# Patient Record
Sex: MALE | Race: WHITE
[De-identification: names, ages, dates, MRNs, and addresses within clinical notes are randomized per-mention and may not be internally consistent; named-entity substitution may affect disease eponyms.]

---

## 2017-02-17 ENCOUNTER — HOSPITAL ENCOUNTER (EMERGENCY)
Dept: HOSPITAL 17 - PHED | Age: 57
Discharge: HOME | End: 2017-02-17
Payer: COMMERCIAL

## 2017-02-17 VITALS
SYSTOLIC BLOOD PRESSURE: 149 MMHG | HEART RATE: 58 BPM | DIASTOLIC BLOOD PRESSURE: 89 MMHG | TEMPERATURE: 98 F | OXYGEN SATURATION: 99 % | RESPIRATION RATE: 16 BRPM

## 2017-02-17 VITALS — HEIGHT: 71 IN | BODY MASS INDEX: 31.67 KG/M2 | WEIGHT: 226.19 LBS

## 2017-02-17 VITALS
DIASTOLIC BLOOD PRESSURE: 86 MMHG | SYSTOLIC BLOOD PRESSURE: 148 MMHG | RESPIRATION RATE: 17 BRPM | OXYGEN SATURATION: 98 % | HEART RATE: 58 BPM

## 2017-02-17 VITALS — OXYGEN SATURATION: 97 %

## 2017-02-17 DIAGNOSIS — K57.32: Primary | ICD-10-CM

## 2017-02-17 LAB
ALP SERPL-CCNC: 102 U/L (ref 45–117)
ALT SERPL-CCNC: 42 U/L (ref 12–78)
ANION GAP SERPL CALC-SCNC: 8 MEQ/L (ref 5–15)
AST SERPL-CCNC: 20 U/L (ref 15–37)
BASOPHILS # BLD AUTO: 0.1 TH/MM3 (ref 0–0.2)
BASOPHILS NFR BLD: 0.6 % (ref 0–2)
BILIRUB SERPL-MCNC: 0.8 MG/DL (ref 0.2–1)
BUN SERPL-MCNC: 14 MG/DL (ref 7–18)
CHLORIDE SERPL-SCNC: 105 MEQ/L (ref 98–107)
COLOR UR: YELLOW
COMMENT (UR): (no result)
CULTURE IF INDICATED: (no result)
EOSINOPHIL # BLD: 0.1 TH/MM3 (ref 0–0.4)
EOSINOPHIL NFR BLD: 1.1 % (ref 0–4)
ERYTHROCYTE [DISTWIDTH] IN BLOOD BY AUTOMATED COUNT: 11.9 % (ref 11.6–17.2)
GFR SERPLBLD BASED ON 1.73 SQ M-ARVRAT: 81 ML/MIN (ref 89–?)
GLUCOSE UR STRIP-MCNC: (no result) MG/DL
HCO3 BLD-SCNC: 28.5 MEQ/L (ref 21–32)
HCT VFR BLD CALC: 43.2 % (ref 39–51)
HEMO FLAGS: (no result)
HGB UR QL STRIP: (no result)
KETONES UR STRIP-MCNC: (no result) MG/DL
LEUKOCYTE ESTERASE UR QL STRIP: (no result) /HPF (ref 0–5)
LYMPHOCYTES # BLD AUTO: 1.5 TH/MM3 (ref 1–4.8)
LYMPHOCYTES NFR BLD AUTO: 16.7 % (ref 9–44)
MCH RBC QN AUTO: 32 PG (ref 27–34)
MCHC RBC AUTO-ENTMCNC: 34.2 % (ref 32–36)
MCV RBC AUTO: 93.4 FL (ref 80–100)
METHOD OF COLLECTION: (no result)
MONOCYTES NFR BLD: 8.3 % (ref 0–8)
MUCOUS THREADS #/AREA URNS LPF: (no result) /LPF
NEUTROPHILS # BLD AUTO: 6.3 TH/MM3 (ref 1.8–7.7)
NEUTROPHILS NFR BLD AUTO: 73.3 % (ref 16–70)
NITRITE UR QL STRIP: (no result)
PLATELET # BLD: 357 TH/MM3 (ref 150–450)
POTASSIUM SERPL-SCNC: 4.1 MEQ/L (ref 3.5–5.1)
RBC # BLD AUTO: 4.63 MIL/MM3 (ref 4.5–5.9)
RBC #/AREA URNS HPF: (no result) /HPF (ref 0–3)
SODIUM SERPL-SCNC: 141 MEQ/L (ref 136–145)
SP GR UR STRIP: 1.03 (ref 1–1.03)
SQUAMOUS #/AREA URNS HPF: (no result) /HPF (ref 0–5)
WBC # BLD AUTO: 8.7 TH/MM3 (ref 4–11)

## 2017-02-17 PROCEDURE — 83690 ASSAY OF LIPASE: CPT

## 2017-02-17 PROCEDURE — 96375 TX/PRO/DX INJ NEW DRUG ADDON: CPT

## 2017-02-17 PROCEDURE — 85025 COMPLETE CBC W/AUTO DIFF WBC: CPT

## 2017-02-17 PROCEDURE — 99284 EMERGENCY DEPT VISIT MOD MDM: CPT

## 2017-02-17 PROCEDURE — 87040 BLOOD CULTURE FOR BACTERIA: CPT

## 2017-02-17 PROCEDURE — 81001 URINALYSIS AUTO W/SCOPE: CPT

## 2017-02-17 PROCEDURE — 96367 TX/PROPH/DG ADDL SEQ IV INF: CPT

## 2017-02-17 PROCEDURE — 74176 CT ABD & PELVIS W/O CONTRAST: CPT

## 2017-02-17 PROCEDURE — 80053 COMPREHEN METABOLIC PANEL: CPT

## 2017-02-17 PROCEDURE — 96365 THER/PROPH/DIAG IV INF INIT: CPT

## 2017-02-17 NOTE — RADHPO
EXAM DATE/TIME:  02/17/2017 13:02 

 

[facility] COMPARISON:     

No previous studies available for comparison.

 

 

INDICATIONS :     

Left lower quadrant pain. 

                  

 

ORAL CONTRAST:      

No oral contrast ingested.

                  

 

RADIATION DOSE:     

18.63 CTDIvol (mGy) 

 

 

MEDICAL HISTORY :     

Diverticulitis.  

 

SURGICAL HISTORY :      

None. 

 

ENCOUNTER:      

Initial

 

ACUITY:      

2 days

 

PAIN SCALE:      

5/10

 

LOCATION:       

Left lower quadrant 

 

TECHNIQUE:     

Volumetric scanning of the abdomen and pelvis was performed.  Using automated exposure control and ad
justment of the mA and/or kV according to patient size, radiation dose was kept as low as reasonably 
achievable to obtain optimal diagnostic quality images. 

 

FINDINGS:     

 

LOWER LUNGS:     

The visualized lower lungs are clear.

 

LIVER:     

Homogeneous density without lesion.  There is no dilation of the biliary tree.  No calcified gallston
es.

 

SPLEEN:     

Normal size without lesion.

 

PANCREAS:     

Within normal limits. 

 

KIDNEYS:     

Normal in size and shape.  There is no mass, stone, or hydronephrosis.

 

ADRENAL GLANDS:     

Within normal limits.

 

VASCULAR:     

There is no aortic aneurysm.

 

BOWEL/MESENTERY:     

There is mild wall thickening and surrounding inflammatory change involving the proximal sigmoid colo
n with multiple diverticuli. There is no free air or fluid about gas pattern is nonobstructive. There
 is a normal appendix.

 

ABDOMINAL WALL:     

Within normal limits.

 

RETROPERITONEUM:     

There is no lymphadenopathy.

 

BLADDER:     

No wall thickening or mass.

 

REPRODUCTIVE:     

Within normal limits.

 

INGUINAL:     

There is no lymphadenopathy or hernia.

 

MUSCULOSKELETAL:     

Within normal limits for patient age.

 

CONCLUSION:     

Findings characteristic of acute diverticulitis involving the proximal sigmoid colon.

 

 

 

 Anuj Garcia MD on February 17, 2017 at 13:14           

Board Certified Radiologist.

 This report was verified electronically.

## 2018-03-09 ENCOUNTER — HOSPITAL ENCOUNTER (INPATIENT)
Dept: HOSPITAL 17 - PHED | Age: 58
LOS: 7 days | Discharge: HOME | DRG: 392 | End: 2018-03-16
Attending: HOSPITALIST | Admitting: HOSPITALIST
Payer: COMMERCIAL

## 2018-03-09 VITALS
HEART RATE: 72 BPM | TEMPERATURE: 99.3 F | OXYGEN SATURATION: 96 % | DIASTOLIC BLOOD PRESSURE: 81 MMHG | RESPIRATION RATE: 20 BRPM | SYSTOLIC BLOOD PRESSURE: 141 MMHG

## 2018-03-09 VITALS
DIASTOLIC BLOOD PRESSURE: 64 MMHG | RESPIRATION RATE: 18 BRPM | TEMPERATURE: 99.4 F | OXYGEN SATURATION: 98 % | HEART RATE: 77 BPM | SYSTOLIC BLOOD PRESSURE: 134 MMHG

## 2018-03-09 VITALS
TEMPERATURE: 98.1 F | DIASTOLIC BLOOD PRESSURE: 91 MMHG | SYSTOLIC BLOOD PRESSURE: 161 MMHG | OXYGEN SATURATION: 96 % | HEART RATE: 66 BPM | RESPIRATION RATE: 16 BRPM

## 2018-03-09 VITALS
OXYGEN SATURATION: 98 % | RESPIRATION RATE: 18 BRPM | DIASTOLIC BLOOD PRESSURE: 84 MMHG | HEART RATE: 79 BPM | SYSTOLIC BLOOD PRESSURE: 132 MMHG

## 2018-03-09 VITALS — DIASTOLIC BLOOD PRESSURE: 82 MMHG | SYSTOLIC BLOOD PRESSURE: 140 MMHG

## 2018-03-09 VITALS
RESPIRATION RATE: 20 BRPM | SYSTOLIC BLOOD PRESSURE: 148 MMHG | OXYGEN SATURATION: 99 % | DIASTOLIC BLOOD PRESSURE: 88 MMHG | HEART RATE: 64 BPM | TEMPERATURE: 97.6 F

## 2018-03-09 VITALS
TEMPERATURE: 98.6 F | OXYGEN SATURATION: 97 % | HEART RATE: 64 BPM | DIASTOLIC BLOOD PRESSURE: 80 MMHG | SYSTOLIC BLOOD PRESSURE: 125 MMHG | RESPIRATION RATE: 15 BRPM

## 2018-03-09 VITALS — WEIGHT: 227.96 LBS | HEIGHT: 72 IN | BODY MASS INDEX: 30.88 KG/M2

## 2018-03-09 VITALS — OXYGEN SATURATION: 99 %

## 2018-03-09 DIAGNOSIS — E03.9: ICD-10-CM

## 2018-03-09 DIAGNOSIS — E86.0: ICD-10-CM

## 2018-03-09 DIAGNOSIS — Z87.891: ICD-10-CM

## 2018-03-09 DIAGNOSIS — K56.7: ICD-10-CM

## 2018-03-09 DIAGNOSIS — E78.5: ICD-10-CM

## 2018-03-09 DIAGNOSIS — K59.00: ICD-10-CM

## 2018-03-09 DIAGNOSIS — E07.9: ICD-10-CM

## 2018-03-09 DIAGNOSIS — K57.20: Primary | ICD-10-CM

## 2018-03-09 DIAGNOSIS — K52.9: ICD-10-CM

## 2018-03-09 DIAGNOSIS — K21.9: ICD-10-CM

## 2018-03-09 DIAGNOSIS — D72.829: ICD-10-CM

## 2018-03-09 LAB
ALBUMIN SERPL-MCNC: 3.4 GM/DL (ref 3.4–5)
ALP SERPL-CCNC: 79 U/L (ref 45–117)
ALT SERPL-CCNC: 24 U/L (ref 12–78)
AST SERPL-CCNC: 16 U/L (ref 15–37)
BASOPHILS # BLD AUTO: 0.1 TH/MM3 (ref 0–0.2)
BASOPHILS NFR BLD: 0.6 % (ref 0–2)
BILIRUB SERPL-MCNC: 0.5 MG/DL (ref 0.2–1)
BUN SERPL-MCNC: 16 MG/DL (ref 7–18)
CALCIUM SERPL-MCNC: 9.2 MG/DL (ref 8.5–10.1)
CHLORIDE SERPL-SCNC: 101 MEQ/L (ref 98–107)
CREAT SERPL-MCNC: 1 MG/DL (ref 0.6–1.3)
EOSINOPHIL # BLD: 0 TH/MM3 (ref 0–0.4)
EOSINOPHIL NFR BLD: 0.3 % (ref 0–4)
ERYTHROCYTE [DISTWIDTH] IN BLOOD BY AUTOMATED COUNT: 11.8 % (ref 11.6–17.2)
GFR SERPLBLD BASED ON 1.73 SQ M-ARVRAT: 78 ML/MIN (ref 89–?)
GLUCOSE SERPL-MCNC: 117 MG/DL (ref 74–106)
HCO3 BLD-SCNC: 24.6 MEQ/L (ref 21–32)
HCT VFR BLD CALC: 45.2 % (ref 39–51)
HGB BLD-MCNC: 15 GM/DL (ref 13–17)
LYMPHOCYTES # BLD AUTO: 0.8 TH/MM3 (ref 1–4.8)
LYMPHOCYTES NFR BLD AUTO: 6.2 % (ref 9–44)
MCH RBC QN AUTO: 30.6 PG (ref 27–34)
MCHC RBC AUTO-ENTMCNC: 33.3 % (ref 32–36)
MCV RBC AUTO: 92.1 FL (ref 80–100)
MONOCYTE #: 0.6 TH/MM3 (ref 0–0.9)
MONOCYTES NFR BLD: 4.8 % (ref 0–8)
NEUTROPHILS # BLD AUTO: 11.1 TH/MM3 (ref 1.8–7.7)
NEUTROPHILS NFR BLD AUTO: 88.1 % (ref 16–70)
PLATELET # BLD: 508 TH/MM3 (ref 150–450)
PMV BLD AUTO: 8.8 FL (ref 7–11)
PROT SERPL-MCNC: 7.4 GM/DL (ref 6.4–8.2)
RBC # BLD AUTO: 4.9 MIL/MM3 (ref 4.5–5.9)
SODIUM SERPL-SCNC: 138 MEQ/L (ref 136–145)
WBC # BLD AUTO: 12.6 TH/MM3 (ref 4–11)

## 2018-03-09 PROCEDURE — 96374 THER/PROPH/DIAG INJ IV PUSH: CPT

## 2018-03-09 PROCEDURE — 83690 ASSAY OF LIPASE: CPT

## 2018-03-09 PROCEDURE — 96375 TX/PRO/DX INJ NEW DRUG ADDON: CPT

## 2018-03-09 PROCEDURE — 80053 COMPREHEN METABOLIC PANEL: CPT

## 2018-03-09 PROCEDURE — 74177 CT ABD & PELVIS W/CONTRAST: CPT

## 2018-03-09 PROCEDURE — 82378 CARCINOEMBRYONIC ANTIGEN: CPT

## 2018-03-09 PROCEDURE — 85025 COMPLETE CBC W/AUTO DIFF WBC: CPT

## 2018-03-09 PROCEDURE — 80048 BASIC METABOLIC PNL TOTAL CA: CPT

## 2018-03-09 PROCEDURE — 96361 HYDRATE IV INFUSION ADD-ON: CPT

## 2018-03-09 RX ADMIN — PHENYTOIN SODIUM SCH MLS/HR: 50 INJECTION INTRAMUSCULAR; INTRAVENOUS at 17:13

## 2018-03-09 RX ADMIN — SODIUM CHLORIDE SCH MLS/HR: 900 INJECTION, SOLUTION INTRAVENOUS at 21:22

## 2018-03-09 RX ADMIN — SODIUM CHLORIDE SCH MLS/HR: 900 INJECTION, SOLUTION INTRAVENOUS at 11:19

## 2018-03-09 RX ADMIN — Medication SCH ML: at 19:35

## 2018-03-09 RX ADMIN — STANDARDIZED SENNA CONCENTRATE AND DOCUSATE SODIUM SCH TAB: 8.6; 5 TABLET, FILM COATED ORAL at 09:00

## 2018-03-09 RX ADMIN — MORPHINE SULFATE PRN MG: 2 INJECTION, SOLUTION INTRAMUSCULAR; INTRAVENOUS at 21:24

## 2018-03-09 RX ADMIN — PHENYTOIN SODIUM SCH MLS/HR: 50 INJECTION INTRAMUSCULAR; INTRAVENOUS at 05:19

## 2018-03-09 RX ADMIN — MORPHINE SULFATE PRN MG: 2 INJECTION, SOLUTION INTRAMUSCULAR; INTRAVENOUS at 08:32

## 2018-03-09 RX ADMIN — MORPHINE SULFATE PRN MG: 2 INJECTION, SOLUTION INTRAMUSCULAR; INTRAVENOUS at 12:23

## 2018-03-09 RX ADMIN — ONDANSETRON PRN MG: 2 INJECTION, SOLUTION INTRAMUSCULAR; INTRAVENOUS at 12:23

## 2018-03-09 RX ADMIN — ONDANSETRON PRN MG: 2 INJECTION, SOLUTION INTRAMUSCULAR; INTRAVENOUS at 17:13

## 2018-03-09 RX ADMIN — MORPHINE SULFATE PRN MG: 2 INJECTION, SOLUTION INTRAMUSCULAR; INTRAVENOUS at 17:15

## 2018-03-09 RX ADMIN — Medication SCH ML: at 09:00

## 2018-03-09 RX ADMIN — STANDARDIZED SENNA CONCENTRATE AND DOCUSATE SODIUM SCH TAB: 8.6; 5 TABLET, FILM COATED ORAL at 21:00

## 2018-03-09 RX ADMIN — LEVOTHYROXINE SODIUM SCH MCG: 100 TABLET ORAL at 06:40

## 2018-03-09 RX ADMIN — PHENYTOIN SODIUM SCH MLS/HR: 50 INJECTION INTRAMUSCULAR; INTRAVENOUS at 05:45

## 2018-03-09 RX ADMIN — SODIUM CHLORIDE SCH MLS/HR: 900 INJECTION, SOLUTION INTRAVENOUS at 17:12

## 2018-03-09 RX ADMIN — LEVOTHYROXINE SODIUM SCH MCG: 75 TABLET ORAL at 06:40

## 2018-03-09 RX ADMIN — PHENYTOIN SODIUM SCH MLS/HR: 50 INJECTION INTRAMUSCULAR; INTRAVENOUS at 06:40

## 2018-03-09 NOTE — HHI.HP
__________________________________________________





HPI


Service


[facility]


Primary Care Physician


Víctor Martin, 


Admission Diagnosis





Severe diverticulitis with abscess formation


Diagnoses:  


Travel History


International Travel<30 Days:  No


Contact w/Intl Traveler <30 Da:  No


Traveled to Known Affected Are:  No





Sepsis Criteria


SIRS Criteria (2 or more):  WBC > 50316, < 4000 or > 10% bands


Sepsis Criteria (SIRS+source):  Infect source susp/known


History of Present Illness


Mr. Kenny is a 57 year old male.  He has a history of diverticulitis in the 

past.  He has been having LLQ pain and visited our ER in regards to this.  He 

is found to have reticulitis with abscess.  No evidence of perforation.  

Abscess sizes are approximately 2 cm and 3 cm.  Pain is fairly intense and has 

been controlled with IV morphine at this point.  Sepsis criteria are not 

present.  Patient reports that he's been off her surgery in regards to his 

diverticulitis in the past.  At that time he had declined surgery.  No other 

complaints at this time.  Other medical conditions include hyperlipidemia, 

gastroesophageal reflux disease, and hyperthyroidism.  She also has a history 

of left foot injury with surgical repair and is planning to have a revision of 

the hardware in his left foot soon.





Review of Systems


Constitutional:  COMPLAINS OF: Fatigue, DENIES: Fever, Chills, Night Sweats


Eyes:  DENIES: Blurred vision, Diplopia, Eye inflammation, Eye pain


Ears, nose, mouth, throat:  DENIES: Tinnitus, Hearing loss, Vertigo, Nasal 

discharge


Respiratory:  DENIES: Cough, Snoring, Wheezing, Shortness of breath


Cardiovascular:  DENIES: Chest pain, Palpitations, Syncope


Gastrointestinal:  DENIES: Black stools, Bloody stools, Diarrhea


Musculoskeletal:  DENIES: Joint pain, Muscle aches, Stiffness, Joint Swelling


Integumentary:  DENIES: Abnormal pigmentation, Nail changes, Pruritus, Rash


Hematologic/lymphatic:  DENIES: Bruising, Lymphadenopathy


Immunologic/allergic:  DENIES: Eczema, Urticaria


Neurologic:  DENIES: Abnormal gait, Headache, Paresthesias


Psychiatric:  DENIES: Anxiety, Confusion, Hallucinations





Past Family Social History


Past Medical History


Gastroesophageal reflux disease


Hypothyroidism


Hyperlipidemia


Diverticulosis


History of diverticulitis


Past Surgical History


Left ankle surgery


Reported Medications





Reported Meds & Active Scripts


Active


Reported


Prilosec (Omeprazole Magnesium) 20 Mg Tab   


Synthroid (Levothyroxine Sodium) 175 Mcg Tab 175 Mcg PO DAILY


Allergies:  


Coded Allergies:  


     No Known Allergies (Unverified  Allergy, Unknown, 3/9/18)


Active Ordered Medications





Administered Medications








 Medications


  (Trade)  Dose


 Ordered  Sig/Zacarias


 Route


 PRN Reason  Start Time


 Stop Time Status Last Admin


Dose Admin


 


 Metronidazole  100 ml @ 


 100 mls/hr  Q6H


 IV


   3/9/18 11:00


    3/9/18 11:19


 


 


 Sodium Chloride  1,000 ml @ 


 100 mls/hr  Q10H


 IV


   3/9/18 05:20


    3/9/18 06:40


 


 


 Ondansetron HCl


  (Zofran Inj)  4 mg  Q6H  PRN


 IVP


 NAUSEA OR VOMITING  3/9/18 05:30


    3/9/18 12:23


 


 


 Levothyroxine


 Sodium


  (Synthroid)  75 mcg  DAILY@0600


 PO


   3/9/18 06:00


    3/9/18 06:40


 


 


 Levothyroxine


 Sodium


  (Synthroid)  100 mcg  DAILY@0600


 PO


   3/9/18 06:00


    3/9/18 06:40


 


 


 Morphine Sulfate


  (Morphine Inj)  4 mg  Q4H  PRN


 IV PUSH


 Pain 7 to 10  3/9/18 08:30


    3/9/18 12:23


 








Family History


Hyperlipidemia


Social History


Patient drinks alcohol occasionally


No illicit drug abuse


Smoking history is present, patient quit in .





Physical Exam


Vital Signs





Vital Signs








  Date Time  Temp Pulse Resp B/P (MAP) Pulse Ox O2 Delivery O2 Flow Rate FiO2


 


3/9/18 16:00 99.4 77 18 134/64 (87) 98   


 


3/9/18 12:28   18     


 


3/9/18 12:00 98.1 66 16 161/91 (114) 96   


 


3/9/18 08:00 98.6 64 15 125/80 (95) 97   


 


3/9/18 06:25  68 18 140/82 (101) 98   


 


3/9/18 06:23   18     


 


3/9/18 05:16  79 18 132/84 (100) 98 Room Air  


 


3/9/18 04:34   18     


 


3/9/18 04:09     99   


 


3/9/18 03:19 97.6 64 20 148/88 (108) 99   








Physical Exam


GENERAL: NAD, A&Ox3


HEAD: Normocephalic. 


NECK: Supple, trachea midline. No lymphadenopathy.


EYES: No scleral icterus. No injection or drainage. 


CARDIOVASCULAR: Regular rate and rhythm without murmurs, gallops, or rubs. 


RESPIRATORY: Breath sounds equal bilaterally. No accessory muscle use.


GASTROINTESTINAL: Left lower quadrant tenderness with guarding, no rigidity


MUSCULOSKELETAL: No cyanosis, or edema. 


SKIN: Warm and dry.


NEURO:  No focal neurological deficitis.


Laboratory





Laboratory Tests








Test


  3/9/18


04:00 3/9/18


12:30


 


White Blood Count 12.6  


 


Red Blood Count 4.90  


 


Hemoglobin 15.0  


 


Hematocrit 45.2  


 


Mean Corpuscular Volume 92.1  


 


Mean Corpuscular Hemoglobin 30.6  


 


Mean Corpuscular Hemoglobin


Concent 33.3 


  


 


 


Red Cell Distribution Width 11.8  


 


Platelet Count 508  


 


Mean Platelet Volume 8.8  


 


Neutrophils (%) (Auto) 88.1  


 


Lymphocytes (%) (Auto) 6.2  


 


Monocytes (%) (Auto) 4.8  


 


Eosinophils (%) (Auto) 0.3  


 


Basophils (%) (Auto) 0.6  


 


Neutrophils # (Auto) 11.1  


 


Lymphocytes # (Auto) 0.8  


 


Monocytes # (Auto) 0.6  


 


Eosinophils # (Auto) 0.0  


 


Basophils # (Auto) 0.1  


 


CBC Comment DIFF FINAL  


 


Differential Comment   


 


Blood Urea Nitrogen 16  


 


Creatinine 1.00  


 


Random Glucose 117  


 


Total Protein 7.4  


 


Albumin 3.4  


 


Calcium Level 9.2  


 


Alkaline Phosphatase 79  


 


Aspartate Amino Transf


(AST/SGOT) 16 


  


 


 


Alanine Aminotransferase


(ALT/SGPT) 24 


  


 


 


Total Bilirubin 0.5  


 


Sodium Level 138  


 


Potassium Level 3.7  


 


Chloride Level 101  


 


Carbon Dioxide Level 24.6  


 


Anion Gap 12  


 


Estimat Glomerular Filtration


Rate 78 


  


 


 


Lipase 141  


 


Carcinoembryonic Antigen  1.8 








Result Diagram:  


3/9/18 0400                                                                    

            3/9/18 0400








Caprini VTE Risk Assessment


Caprini VTE Risk Assessment:  No/Low Risk (score <= 1)


Caprini Risk Assessment Model











 Point Value = 1          Point Value = 2  Point Value = 3  Point Value = 5


 


Age 41-60


Minor surgery


BMI > 25 kg/m2


Swollen legs


Varicose veins


Pregnancy or postpartum


History of unexplained or recurrent


   spontaneous 


Oral contraceptives or hormone


   replacement


Sepsis (< 1 month)


Serious lung disease, including


   pneumonia (< 1 month)


Abnormal pulmonary function


Acute myocardial infarction


Congestive heart failure (< 1 month)


History of inflammatory bowel disease


Medical patient at bed rest Age 61-74


Arthroscopic surgery


Major open surgery (> 45 min)


Laparoscopic surgery (> 45 min)


Malignancy


Confined to bed (> 72 hours)


Immobilizing plaster cast


Central venous access Age >= 75


History of VTE


Family history of VTE


Factor V Leiden


Prothrombin 29189W


Lupus anticoagulant


Anticardiolipin antibodies


Elevated serum homocysteine


Heparin-induced thrombocytopenia


Other congenital or acquired


   thrombophilia Stroke (< 1 month)


Elective arthroplasty


Hip, pelvis, or leg fracture


Acute spinal cord injury (< 1 month)








Prophylaxis Regimen











   Total Risk


Factor Score Risk Level Prophylaxis Regimen


 


0-1      Low Early ambulation


 


2 Moderate Order ONE of the following:


*Sequential Compression Device (SCD)


*Heparin 5000 units SQ BID


 


3-4 Higher Order ONE of the following medications:


*Heparin 5000 units SQ TID


*Enoxaparin/Lovenox 40 mg SQ daily (WT < 150 kg, CrCl > 30 mL/min)


*Enoxaparin/Lovenox 30 mg SQ daily (WT < 150 kg, CrCl > 10-29 mL/min)


*Enoxaparin/Lovenox 30 mg SQ BID (WT < 150 kg, CrCl > 30 mL/min)


AND/OR


*Sequential Compression Device (SCD)


 


5 or more Highest Order ONE of the following medications:


*Heparin 5000 units SQ TID (Preferred with Epidurals)


*Enoxaparin/Lovenox 40 mg SQ daily (WT < 150 kg, CrCl > 30 mL/min)


*Enoxaparin/Lovenox 30 mg SQ daily (WT < 150 kg, CrCl > 10-29 mL/min)


*Enoxaparin/Lovenox 30 mg SQ BID (WT < 150 kg, CrCl > 30 mL/min)


AND


*Sequential Compression Device (SCD)











Assessment and Plan


Problem List:  


(1) Intra-abdominal abscess


ICD Code:  K65.1 - Peritoneal abscess


Status:  Acute


(2) Acute diverticulitis


ICD Code:  K57.92 - Diverticulitis of intestine, part unspecified, without 

perforation or abscess without bleeding


Status:  Acute


Assessment and Plan


57-year-old male admitted secondary to acute diverticulitis with abscess





Acute diverticulitis


Intra-abdominal abscess


Diverticulosis


Prior history of diverticulitis


Continue Levaquin


Continue metronidazole


IV hydration


Sips of clear liquids


GI consulted


Surgery consulted


Monitor for any worsening clinical status





Hypothyroidism


Hold treatment for now


Resume treatment when patient taking by mouth again





Gastroesophageal reflux disease


Treatment on hold for now





Hyperlipidemia


Hold treatment for now





DVT prophylaxis


SCDs





Physician Certification


2 Midnight Certification Type:  Admission for Inpatient Services


Order for Inpatient Services


The services are ordered in accordance with Medicare regulations or non-

Medicare payer requirements, as applicable.  In the case of services not 

specified as inpatient-only, they are appropriately provided as inpatient 

services in accordance with the 2-midnight benchmark.


Estimated LOS (days):  4


 days is the estimated time the patient will need to remain in the hospital, 

assuming treatment plan goals are met and no additional complications.


Post-Hospital Plan:  Home











Tee Kinsey MD Mar 9, 2018 17:04

## 2018-03-09 NOTE — RADRPT
EXAM DATE/TIME:  03/09/2018 04:19 

 

[facility] COMPARISON:     

CT ABDOMEN & PELVIS W/O CONTRAST, February 17, 2017, 13:02.

 

 

INDICATIONS :     

Lower abdomen pain past week.

                      

 

IV CONTRAST:     

95 cc Omnipaque 350 (iohexol) IV 

 

 

ORAL CONTRAST:      

No oral contrast ingested.

                      

 

RADIATION DOSE:     

18.29 CTDIvol (mGy) 

 

 

MEDICAL HISTORY :     

Diverticulitis. Gastroesophageal reflux disease. Hypercholesterolemia.

 

SURGICAL HISTORY :      

None. 

 

ENCOUNTER:      

Initial

 

ACUITY:      

1 week

 

PAIN SCALE:      

9/10

 

LOCATION:       

Bilateral  low abdomen

 

TECHNIQUE:     

Volumetric scanning of the abdomen and pelvis was performed.  Using automated exposure control and ad
justment of the mA and/or kV according to patient size, radiation dose was kept as low as reasonably 
achievable to obtain optimal diagnostic quality images.  DICOM format image data is available electro
nically for review and comparison.  

 

FINDINGS:     

 

LOWER LUNGS:     

The visualized lower lungs are clear.

 

LIVER:     

Homogeneous density without lesion.  There is no dilation of the biliary tree.  No calcified gallston
es.

 

SPLEEN:     

Normal size without lesion.

 

PANCREAS:     

Within normal limits.

 

KIDNEYS:     

A few scattered small cysts of both kidneys.

 

ADRENAL GLANDS:     

Within normal limits.

 

VASCULAR:     

There is no aortic aneurysm.

 

BOWEL/MESENTERY:     

There is severe colitis in the region of the proximal sigmoid colon in the left lower quadrant. This 
is presumably on the basis of diverticulitis but the colitis extends distal from about 10 cm and prox
imal by about 5 cm from the epicenter of inflammation. Anterior to the epicenter is an ill-defined ex
traluminal fluid collection containing pockets of gas compatible with an abscess and measures approxi
mately 2.9 x 3.9 x 3.5 cm. There is also an abscess within the wall of the sigmoid colon demonstrated
 on series 2 image 56 and series 601 image 41, measures approximately 2.1 x 2.1 x 1.8 cm. Slightly mo
re distal is an additional intramural fluid collection that measures approximately 1.5 cm, series 2 i
mage 62. No free intraperitoneal air. No obstruction demonstrated. The appendix is normal.

 

ABDOMINAL WALL:     

Within normal limits.

 

RETROPERITONEUM:     

There is no lymphadenopathy. 

 

BLADDER:     

No wall thickening or mass. 

 

REPRODUCTIVE:     

Within normal limits.

 

INGUINAL:     

There is no lymphadenopathy or hernia. 

 

MUSCULOSKELETAL:     

No acute bony abnormality.

 

CONCLUSION:     

Severe sigmoid colitis, presumably diverticulitis. This is complicated by intramural abscesses and al
so an extraluminal abscess in the left lower quadrant.

 

 

 

 Nile Morrison MD on March 09, 2018 at 4:41           

Board Certified Radiologist.

 This report was verified electronically.

## 2018-03-09 NOTE — PD
HPI


Chief Complaint:  GI Complaint


Time Seen by Provider:  03:22


Travel History


International Travel<30 days:  No


Contact w/Intl Traveler<30days:  No


Traveled to known affect area:  No





History of Present Illness


HPI


The patient is a 57-year-old male with a history of diverticulitis who is 

scheduled this morning to have endoscopy done by Dr. Brown in [facility].  

Patient experienced severe left lower quadrant pain and came to the emergency 

department.  He had already taken some of the bowel prep that was given to him 

but his pain increased after he took the bowel prep.  He denies any fever.  The 

patient's pain is a crampy, sharp pain in the left lower quadrant and a an 8/10.





PFSH


Past Medical History


High Cholesterol:  Yes


Diverticulitis:  Yes


GERD:  Yes


Thyroid Disease:  Yes


Tetanus Vaccination:  Unknown


Influenza Vaccination:  Yes





Family History


Family Hypercholesterolemia:  Yes





Social History


Alcohol Use:  Yes (occ)


Tobacco Use:  No (quit 2017)


Substance Use:  No





Allergies-Medications


(Allergen,Severity, Reaction):  


Coded Allergies:  


     No Known Allergies (Unverified  Adverse Reaction, Unknown, 3/9/18)


Reported Meds & Prescriptions





Reported Meds & Active Scripts


Active


Reported


Prilosec (Omeprazole Magnesium) 20 Mg Tab   


Synthroid (Levothyroxine Sodium) 175 Mcg Tab 175 Mcg PO DAILY








Review of Systems


Except as stated in HPI:  all other systems reviewed are Neg





Physical Exam


Narrative


GENERAL: The patient is alert, oriented 3 in moderate to severe distress with 

his left lower quadrant abdominal discomfort.  The vital signs show temperature 

97.6 with blood pressure 148/88 but the rest of the vital signs are normal.  

The patient also appears to be mild to moderately dehydrated.


SKIN: Focused skin assessment warm/dry.


HEAD: Atraumatic. Normocephalic. 


EYES: Pupils equal and round. No scleral icterus. No injection or drainage. 


ENT: No nasal bleeding or discharge.  Mucous membranes pink and moist.


NECK: Trachea midline. No JVD. 


CARDIOVASCULAR: Regular rate and rhythm.  No murmur appreciated.


RESPIRATORY: No accessory muscle use. Clear to auscultation. Breath sounds 

equal bilaterally. 


GASTROINTESTINAL: Abdomen shows guarding in the left lower quadrant with 

tenderness in the left lower quadrant and slight rebound is present, 

nondistended. Hepatic and splenic margins not palpable. 


MUSCULOSKELETAL: No obvious deformities. No clubbing.  No cyanosis.  No edema. 


NEUROLOGICAL: Awake and alert. No obvious cranial nerve deficits.  Motor 

grossly within normal limits. Normal speech.


PSYCHIATRIC: Appropriate mood and affect; insight and judgment normal.


RECTAL EXAM: No masses or tenderness, stool is brown.  No fecal impactions are 

noticed, the stool is guaiac negative.





Data


Data


Last Documented VS





Vital Signs








  Date Time  Temp Pulse Resp B/P (MAP) Pulse Ox O2 Delivery O2 Flow Rate FiO2


 


3/9/18 05:16  79 18 132/84 (100) 98 Room Air  


 


3/9/18 03:19 97.6       








Orders





 Orders


Peg (High)/E-Lyte Liq (Colyte Liq) (3/9/18 03:45)


Complete Blood Count With Diff (3/9/18 03:49)


Comprehensive Metabolic Panel (3/9/18 03:49)


Lipase (3/9/18 03:49)


Ct Abd/Pel W Iv Contrast(Rout) (3/9/18 03:49)


Iv Access Insert/Monitor (3/9/18 03:49)


Ecg Monitoring (3/9/18 03:49)


Oximetry (3/9/18 03:49)


Morphine Inj (Morphine Inj) (3/9/18 04:00)


Ondansetron Inj (Zofran Inj) (3/9/18 04:00)


Sodium Chlor 0.9% 1000 Ml Inj (Ns 1000 M (3/9/18 03:49)


Sodium Chloride 0.9% Flush (Ns Flush) (3/9/18 04:00)


Iohexol 350 Inj (Omnipaque 350 Inj) (3/9/18 03:13)


Metronidazole 500 Mg Inj (Flagyl 500 Mg (3/9/18 05:15)


Levofloxacin 750 Mg Premix Inj (Levaquin (3/9/18 05:15)


Sodium Chlor 0.9% 1000 Ml Inj (Ns 1000 M (3/9/18 05:15)





Labs





Laboratory Tests








Test


  3/9/18


04:00


 


White Blood Count 12.6 TH/MM3 


 


Red Blood Count 4.90 MIL/MM3 


 


Hemoglobin 15.0 GM/DL 


 


Hematocrit 45.2 % 


 


Mean Corpuscular Volume 92.1 FL 


 


Mean Corpuscular Hemoglobin 30.6 PG 


 


Mean Corpuscular Hemoglobin


Concent 33.3 % 


 


 


Red Cell Distribution Width 11.8 % 


 


Platelet Count 508 TH/MM3 


 


Mean Platelet Volume 8.8 FL 


 


Neutrophils (%) (Auto) 88.1 % 


 


Lymphocytes (%) (Auto) 6.2 % 


 


Monocytes (%) (Auto) 4.8 % 


 


Eosinophils (%) (Auto) 0.3 % 


 


Basophils (%) (Auto) 0.6 % 


 


Neutrophils # (Auto) 11.1 TH/MM3 


 


Lymphocytes # (Auto) 0.8 TH/MM3 


 


Monocytes # (Auto) 0.6 TH/MM3 


 


Eosinophils # (Auto) 0.0 TH/MM3 


 


Basophils # (Auto) 0.1 TH/MM3 


 


CBC Comment DIFF FINAL 


 


Differential Comment  


 


Blood Urea Nitrogen 16 MG/DL 


 


Creatinine 1.00 MG/DL 


 


Random Glucose 117 MG/DL 


 


Total Protein 7.4 GM/DL 


 


Albumin 3.4 GM/DL 


 


Calcium Level 9.2 MG/DL 


 


Alkaline Phosphatase 79 U/L 


 


Aspartate Amino Transf


(AST/SGOT) 16 U/L 


 


 


Alanine Aminotransferase


(ALT/SGPT) 24 U/L 


 


 


Total Bilirubin 0.5 MG/DL 


 


Sodium Level 138 MEQ/L 


 


Potassium Level 3.7 MEQ/L 


 


Chloride Level 101 MEQ/L 


 


Carbon Dioxide Level 24.6 MEQ/L 


 


Anion Gap 12 MEQ/L 


 


Estimat Glomerular Filtration


Rate 78 ML/MIN 


 


 


Lipase 141 U/L 











[facility]


Medical Decision Making


Medical Screen Exam Complete:  Yes


Emergency Medical Condition:  Yes


Medical Record Reviewed:  Yes


Interpretation(s)


The CBC shows a white count of 12,600 with 508,000 platelets and 88% 

neutrophils.  The complete metabolic profile shows a GFR of 78 but is otherwise 

normal.  The lipase is normal.  The CT abdomen pelvis shows severe sigmoid 

colitis presumably from diverticulitis.  This is complicated by intramural 

abscesses and also an extraluminal abscess in the left lower quadrant.  The 

extraluminal abscess measures 2.9 x 3.9 x 3.5 cm.


Differential Diagnosis


Diverticulitis, colitis, intra-abdominal abscesses, electrolyte disorder, anemia

, GI bleed, electrolyte disorder, dehydration


Narrative Course


The patient has diverticulitis/colitis with intramural abscesses and an 

extraluminal abscess.





Physician Communication


Physician Communication


I discussed the patient with Dr. Johnson.





Diagnosis





 Primary Impression:  


 Acute diverticulitis


 Additional Impression:  


 Intra-abdominal abscess





Admitting Information


Admitting Physician Requests:  Admit











Parker Lozoya MD Mar 9, 2018 05:08

## 2018-03-10 VITALS
SYSTOLIC BLOOD PRESSURE: 132 MMHG | TEMPERATURE: 99.3 F | HEART RATE: 73 BPM | OXYGEN SATURATION: 92 % | RESPIRATION RATE: 18 BRPM | DIASTOLIC BLOOD PRESSURE: 76 MMHG

## 2018-03-10 VITALS
HEART RATE: 69 BPM | SYSTOLIC BLOOD PRESSURE: 166 MMHG | RESPIRATION RATE: 20 BRPM | DIASTOLIC BLOOD PRESSURE: 102 MMHG | OXYGEN SATURATION: 94 % | TEMPERATURE: 99.7 F

## 2018-03-10 VITALS
OXYGEN SATURATION: 96 % | RESPIRATION RATE: 22 BRPM | TEMPERATURE: 97.8 F | HEART RATE: 86 BPM | DIASTOLIC BLOOD PRESSURE: 89 MMHG | SYSTOLIC BLOOD PRESSURE: 150 MMHG

## 2018-03-10 VITALS
TEMPERATURE: 96.7 F | HEART RATE: 70 BPM | SYSTOLIC BLOOD PRESSURE: 125 MMHG | RESPIRATION RATE: 18 BRPM | DIASTOLIC BLOOD PRESSURE: 80 MMHG | OXYGEN SATURATION: 93 %

## 2018-03-10 VITALS
TEMPERATURE: 98.7 F | SYSTOLIC BLOOD PRESSURE: 121 MMHG | RESPIRATION RATE: 18 BRPM | HEART RATE: 74 BPM | OXYGEN SATURATION: 91 % | DIASTOLIC BLOOD PRESSURE: 70 MMHG

## 2018-03-10 VITALS — RESPIRATION RATE: 18 BRPM

## 2018-03-10 VITALS — RESPIRATION RATE: 16 BRPM

## 2018-03-10 LAB
BASOPHILS # BLD AUTO: 0 TH/MM3 (ref 0–0.2)
BASOPHILS NFR BLD: 0.2 % (ref 0–2)
BUN SERPL-MCNC: 10 MG/DL (ref 7–18)
CALCIUM SERPL-MCNC: 8.4 MG/DL (ref 8.5–10.1)
CHLORIDE SERPL-SCNC: 102 MEQ/L (ref 98–107)
CREAT SERPL-MCNC: 0.88 MG/DL (ref 0.6–1.3)
EOSINOPHIL # BLD: 0 TH/MM3 (ref 0–0.4)
EOSINOPHIL NFR BLD: 0.1 % (ref 0–4)
ERYTHROCYTE [DISTWIDTH] IN BLOOD BY AUTOMATED COUNT: 12.4 % (ref 11.6–17.2)
GFR SERPLBLD BASED ON 1.73 SQ M-ARVRAT: 90 ML/MIN (ref 89–?)
GLUCOSE SERPL-MCNC: 126 MG/DL (ref 74–106)
HCO3 BLD-SCNC: 27.1 MEQ/L (ref 21–32)
HCT VFR BLD CALC: 38.8 % (ref 39–51)
HGB BLD-MCNC: 13.2 GM/DL (ref 13–17)
LYMPHOCYTES # BLD AUTO: 0.5 TH/MM3 (ref 1–4.8)
LYMPHOCYTES NFR BLD AUTO: 4 % (ref 9–44)
MCH RBC QN AUTO: 31.3 PG (ref 27–34)
MCHC RBC AUTO-ENTMCNC: 34 % (ref 32–36)
MCV RBC AUTO: 92.2 FL (ref 80–100)
MONOCYTE #: 0.6 TH/MM3 (ref 0–0.9)
MONOCYTES NFR BLD: 4.3 % (ref 0–8)
NEUTROPHILS # BLD AUTO: 12.2 TH/MM3 (ref 1.8–7.7)
NEUTROPHILS NFR BLD AUTO: 91.4 % (ref 16–70)
PLATELET # BLD: 420 TH/MM3 (ref 150–450)
PMV BLD AUTO: 8.7 FL (ref 7–11)
RBC # BLD AUTO: 4.21 MIL/MM3 (ref 4.5–5.9)
SODIUM SERPL-SCNC: 140 MEQ/L (ref 136–145)
WBC # BLD AUTO: 13.3 TH/MM3 (ref 4–11)

## 2018-03-10 RX ADMIN — ONDANSETRON PRN MG: 2 INJECTION, SOLUTION INTRAMUSCULAR; INTRAVENOUS at 05:41

## 2018-03-10 RX ADMIN — PHENYTOIN SODIUM SCH MLS/HR: 50 INJECTION INTRAMUSCULAR; INTRAVENOUS at 12:14

## 2018-03-10 RX ADMIN — MORPHINE SULFATE PRN MG: 2 INJECTION, SOLUTION INTRAMUSCULAR; INTRAVENOUS at 08:59

## 2018-03-10 RX ADMIN — MORPHINE SULFATE PRN MG: 2 INJECTION, SOLUTION INTRAMUSCULAR; INTRAVENOUS at 12:14

## 2018-03-10 RX ADMIN — MORPHINE SULFATE PRN MG: 2 INJECTION, SOLUTION INTRAMUSCULAR; INTRAVENOUS at 20:27

## 2018-03-10 RX ADMIN — STANDARDIZED SENNA CONCENTRATE AND DOCUSATE SODIUM SCH TAB: 8.6; 5 TABLET, FILM COATED ORAL at 08:59

## 2018-03-10 RX ADMIN — SODIUM CHLORIDE SCH MLS/HR: 900 INJECTION, SOLUTION INTRAVENOUS at 23:56

## 2018-03-10 RX ADMIN — ONDANSETRON PRN MG: 2 INJECTION, SOLUTION INTRAMUSCULAR; INTRAVENOUS at 12:14

## 2018-03-10 RX ADMIN — LEVOTHYROXINE SODIUM SCH MCG: 75 TABLET ORAL at 05:40

## 2018-03-10 RX ADMIN — WATER PRN ML: 1 IRRIGANT IRRIGATION at 23:56

## 2018-03-10 RX ADMIN — ONDANSETRON PRN MG: 2 INJECTION, SOLUTION INTRAMUSCULAR; INTRAVENOUS at 20:26

## 2018-03-10 RX ADMIN — LEVOFLOXACIN SCH MLS/HR: 5 INJECTION, SOLUTION INTRAVENOUS at 05:38

## 2018-03-10 RX ADMIN — SODIUM CHLORIDE SCH MLS/HR: 900 INJECTION, SOLUTION INTRAVENOUS at 11:15

## 2018-03-10 RX ADMIN — STANDARDIZED SENNA CONCENTRATE AND DOCUSATE SODIUM SCH TAB: 8.6; 5 TABLET, FILM COATED ORAL at 20:26

## 2018-03-10 RX ADMIN — MORPHINE SULFATE PRN MG: 2 INJECTION, SOLUTION INTRAMUSCULAR; INTRAVENOUS at 05:41

## 2018-03-10 RX ADMIN — ONDANSETRON PRN MG: 2 INJECTION, SOLUTION INTRAMUSCULAR; INTRAVENOUS at 00:41

## 2018-03-10 RX ADMIN — PHENYTOIN SODIUM SCH MLS/HR: 50 INJECTION INTRAMUSCULAR; INTRAVENOUS at 21:20

## 2018-03-10 RX ADMIN — MORPHINE SULFATE PRN MG: 2 INJECTION, SOLUTION INTRAMUSCULAR; INTRAVENOUS at 00:41

## 2018-03-10 RX ADMIN — SODIUM CHLORIDE SCH MLS/HR: 900 INJECTION, SOLUTION INTRAVENOUS at 17:09

## 2018-03-10 RX ADMIN — WATER PRN ML: 1 IRRIGANT IRRIGATION at 23:54

## 2018-03-10 RX ADMIN — Medication SCH ML: at 20:05

## 2018-03-10 RX ADMIN — MORPHINE SULFATE PRN MG: 2 INJECTION, SOLUTION INTRAMUSCULAR; INTRAVENOUS at 23:54

## 2018-03-10 RX ADMIN — LEVOTHYROXINE SODIUM SCH MCG: 100 TABLET ORAL at 05:40

## 2018-03-10 RX ADMIN — MORPHINE SULFATE PRN MG: 2 INJECTION, SOLUTION INTRAMUSCULAR; INTRAVENOUS at 15:58

## 2018-03-10 RX ADMIN — Medication SCH ML: at 09:00

## 2018-03-10 RX ADMIN — POTASSIUM CHLORIDE SCH MLS/HR: 200 INJECTION, SOLUTION INTRAVENOUS at 12:13

## 2018-03-10 RX ADMIN — POTASSIUM CHLORIDE SCH MLS/HR: 200 INJECTION, SOLUTION INTRAVENOUS at 08:59

## 2018-03-10 RX ADMIN — SODIUM CHLORIDE SCH MLS/HR: 900 INJECTION, SOLUTION INTRAVENOUS at 05:39

## 2018-03-10 RX ADMIN — PHENYTOIN SODIUM SCH MLS/HR: 50 INJECTION INTRAMUSCULAR; INTRAVENOUS at 01:20

## 2018-03-10 NOTE — HHI.PR
Subjective


Remarks


No significant clinical improvement compared to yesterday.  Left lower quadrant 

continues to be painful including with movement.  No fevers overnight.





Objective





Vital Signs








  Date Time  Temp Pulse Resp B/P (MAP) Pulse Ox O2 Delivery O2 Flow Rate FiO2


 


3/10/18 09:04   16     


 


3/10/18 07:42 98.7 74 18 121/70 (87) 91   


 


3/10/18 00:00 99.7 69 20 166/102 (123) 94   


 


3/9/18 20:00 99.3 72 20 141/81 (101) 96   


 


3/9/18 16:00 99.4 77 18 134/64 (87) 98   














I/O      


 


 3/9/18 3/9/18 3/9/18 3/10/18 3/10/18 3/10/18





 07:00 15:00 23:00 07:00 15:00 23:00


 


Intake Total 2100 ml 250 ml 1550 ml 480 ml 1810 ml 


 


Output Total  300 ml 200 ml 450 ml 800 ml 


 


Balance 2100 ml -50 ml 1350 ml 30 ml 1010 ml 


 


      


 


Intake Oral   300 ml 480 ml 960 ml 


 


IV Total 2100 ml 250 ml 1250 ml  850 ml 


 


Output Urine Total  300 ml 200 ml 450 ml 800 ml 


 


# Voids   1   


 


# Bowel Movements     1 








Result Diagram:  


3/10/18 0730                                                                   

             3/10/18 0730





Objective Remarks


GENERAL: NAD, A&Ox3


HEAD: Normocephalic. 


NECK: Supple, trachea midline. No lymphadenopathy.


EYES: No scleral icterus. No injection or drainage. 


CARDIOVASCULAR: Regular rate and rhythm without murmurs, gallops, or rubs. 


RESPIRATORY: Breath sounds equal bilaterally. No accessory muscle use.


GASTROINTESTINAL: Abdomen tender left lower quadrant with guarding.


MUSCULOSKELETAL: No cyanosis, or edema. 


SKIN: Warm and dry.


NEURO:  No focal neurological deficitis.





A/P


Problem List:  


(1) Acute diverticulitis


ICD Code:  K57.92 - Diverticulitis of intestine, part unspecified, without 

perforation or abscess without bleeding


Status:  Acute


(2) Intra-abdominal abscess


ICD Code:  K65.1 - Peritoneal abscess


Status:  Acute


Assessment and Plan


57-year-old male admitted secondary to acute diverticulitis with abscess





No significant improvement at 24 hours.  If no improvement occurs by tomorrow, 

we'll consider repeat imaging.  Labs reviewed.  Slight increase trend upwards 

in white blood cell count, despite antibiotics.  Continue to monitor labs.  

Labs ordered for further monitoring.





Acute diverticulitis


Intra-abdominal abscess


Diverticulosis


Prior history of diverticulitis


Continue Levaquin


Continue metronidazole


IV hydration


Sips of clear liquids


GI consulted


Surgery consulted


Monitor for any worsening clinical status





Hypothyroidism


Hold treatment for now


Resume treatment when patient taking by mouth again





Gastroesophageal reflux disease


Treatment on hold for now





Hyperlipidemia


Hold treatment for now





DVT prophylaxis


SCDs











Tee Kinsey MD Mar 10, 2018 15:34

## 2018-03-10 NOTE — PD.CONS
__________________________________________________





HPI


Service


General surgery


Consult Requested By


Dr. Lozoya


Reason for Consult


Diverticular abscess


Primary Care Physician


Víctor Martin, DO


History of Present Illness


Mr. Kenny is a 57-year-old male who has had issues with diverticulitis for 

approximately 10 years.  Most recently for a few weeks he was having mild left 

lower quadrant pain and was given Cipro and Flagyl by his gastroenterologist 

Dr. Sears.  He was actually planned for a colonoscopy on Thursday and began 

the colon prep.  After beginning the prep he began to have much more severe 

left lower quadrant abdominal pain also associated with nausea and vomiting.  

He presented to the emergency department was noted to have leukocytosis and on 

CT of the abdomen and pelvis he had severe diverticulitis with a couple of 

associated abscesses.  His pain is now improved but still fairly severe.





Review of Systems


Constitutional:  DENIES: Fever, Chills


Eyes:  DENIES: Eye inflammation, Eye pain


Ears, nose, mouth, throat:  DENIES: Oral lesions, Throat pain


Respiratory:  DENIES: Cough, Shortness of breath


Cardiovascular:  DENIES: Chest pain, Palpitations


Gastrointestinal:  COMPLAINS OF: Abdominal pain


Integumentary:  DENIES: Pruritus, Rash


Neurologic:  DENIES: Paresthesias, Seizures





Past Family Social History


Past Medical History


Diverticulitis


GERD


Past Surgical History


No previous abdominal surgeries


Reported Medications





Reported Meds & Active Scripts


Active


Reported


Prilosec (Omeprazole Magnesium) 20 Mg Tab   


Synthroid (Levothyroxine Sodium) 175 Mcg Tab 175 Mcg PO DAILY


Allergies:  


Coded Allergies:  


     No Known Allergies (Unverified  Allergy, Unknown, 3/9/18)


Active Ordered Medications





Current Medications








 Medications


  (Trade)  Dose


 Ordered  Sig/Zacarias


 Route  Start Time


 Stop Time Status Last Admin


 


 Metronidazole  100 ml @ 


 100 mls/hr  Q6H


 IV  3/9/18 11:00


    3/10/18 05:39


 


 


 Levofloxacin/


 Dextrose  150 ml @ 


 100 mls/hr  Q24H


 IV  3/10/18 06:00


    3/10/18 05:38


 


 


 Sodium Chloride  1,000 ml @ 


 100 mls/hr  Q10H


 IV  3/9/18 05:20


    3/10/18 01:20


 


 


  (NS Flush)  2 ml  UNSCH  PRN


 IV FLUSH  3/9/18 05:30


     


 


 


  (NS Flush)  2 ml  BID


 IV FLUSH  3/9/18 09:00


    3/10/18 09:00


 


 


  (Tylenol)  650 mg  Q4H  PRN


 PO  3/9/18 05:30


     


 


 


  (Zofran Inj)  4 mg  Q6H  PRN


 IVP  3/9/18 05:30


    3/10/18 05:41


 


 


  (Narcan Inj)  0.4 mg  UNSCH  PRN


 IV PUSH  3/9/18 05:30


     


 


 


  (Riddhi-Colace)  1 tab  BID


 PO  3/9/18 09:00


    3/10/18 08:59


 


 


  (Milk Of


 Magnesia Liq)  30 ml  Q12H  PRN


 PO  3/9/18 05:30


     


 


 


  (Senokot)  17.2 mg  Q12H  PRN


 PO  3/9/18 05:30


     


 


 


  (Dulcolax Supp)  10 mg  DAILY  PRN


 RECTAL  3/9/18 05:30


     


 


 


  (Lactulose Liq)  30 ml  DAILY  PRN


 PO  3/9/18 05:30


     


 


 


  (Synthroid)  75 mcg  DAILY@0600


 PO  3/9/18 06:00


    3/10/18 05:40


 


 


  (Synthroid)  100 mcg  DAILY@0600


 PO  3/9/18 06:00


    3/10/18 05:40


 


 


  (Morphine Inj)  4 mg  Q3H  PRN


 IV PUSH  3/9/18 17:00


    3/10/18 08:59


 


 


  (Morphine Inj)  2 mg  Q3H  PRN


 IV PUSH  3/9/18 17:00


     


 


 


 Potassium Chloride  100 ml @ 


 50 mls/hr  Q2H


 IV  3/10/18 09:00


 3/10/18 12:59  3/10/18 08:59


 








Family History


Noncontributory


Social History


Drinks alcohol socially.  No current tobacco or drug use.





Physical Exam


Vital Signs





Vital Signs








  Date Time  Temp Pulse Resp B/P (MAP) Pulse Ox O2 Delivery O2 Flow Rate FiO2


 


3/10/18 07:42 98.7 74 18 121/70 (87) 91   


 


3/10/18 00:00 99.7 69 20 166/102 (123) 94   


 


3/9/18 20:00 99.3 72 20 141/81 (101) 96   


 


3/9/18 17:20   18     


 


3/9/18 16:00 99.4 77 18 134/64 (87) 98   


 


3/9/18 12:28   18     


 


3/9/18 12:00 98.1 66 16 161/91 (114) 96   








Physical Exam


GENERAL: Awake and alert.  Cooperative.  In some pain especially when I laid 

his bed flat.


HEAD: Normocephalic.  Atraumatic.


EYES:  Pupils equal round and reactive to light bilaterally.  No scleral 

icterus.


ENT: Moist oral mucosa.


NECK: Trachea midline.


CHEST: Lungs clear to auscultation bilaterally with no wheezing or rhonchi.  No 

respiratory distress.


CARDIOVASCULAR:  Regular rate and rhythm.


ABDOMEN: Mild tenderness in the left upper abdomen.  Severe tenderness with 

rebound in the left lower abdomen.  Otherwise soft and nontender.


EXTREMITIES: No cyanosis or edema.


SKIN: Warm, dry, nonjaundiced.


Laboratory





Laboratory Tests








Test


  3/9/18


12:30 3/10/18


07:30


 


Carcinoembryonic Antigen 1.8  


 


White Blood Count  13.3 


 


Red Blood Count  4.21 


 


Hemoglobin  13.2 


 


Hematocrit  38.8 


 


Mean Corpuscular Volume  92.2 


 


Mean Corpuscular Hemoglobin  31.3 


 


Mean Corpuscular Hemoglobin


Concent 


  34.0 


 


 


Red Cell Distribution Width  12.4 


 


Platelet Count  420 


 


Mean Platelet Volume  8.7 


 


Neutrophils (%) (Auto)  91.4 


 


Lymphocytes (%) (Auto)  4.0 


 


Monocytes (%) (Auto)  4.3 


 


Eosinophils (%) (Auto)  0.1 


 


Basophils (%) (Auto)  0.2 


 


Neutrophils # (Auto)  12.2 


 


Lymphocytes # (Auto)  0.5 


 


Monocytes # (Auto)  0.6 


 


Eosinophils # (Auto)  0.0 


 


Basophils # (Auto)  0.0 


 


CBC Comment  DIFF FINAL 


 


Differential Comment   


 


Blood Urea Nitrogen  10 


 


Creatinine  0.88 


 


Random Glucose  126 


 


Calcium Level  8.4 


 


Sodium Level  140 


 


Potassium Level  3.3 


 


Chloride Level  102 


 


Carbon Dioxide Level  27.1 


 


Anion Gap  11 


 


Estimat Glomerular Filtration


Rate 


  90 


 








Result Diagram:  


3/10/18 0730                                                                   

             3/10/18 0730





Imaging





Last Impressions








Abdomen/Pelvis CT 3/9/18 0349 Signed





Impressions: 





 Service Date/Time:  Friday, March 9, 2018 04:19 - CONCLUSION:  Severe sigmoid 





 colitis, presumably diverticulitis. This is complicated by intramural 

abscesses 





 and also an extraluminal abscess in the left lower quadrant.     Nile Morrison MD 











Assessment and Plan


Assessment and Plan


Mr. Kenny is a 57-year-old male with long-standing history of diverticulitis.  

He now has severe diverticulitis with associated abscesses.  The largest is 3 

or 4 cm and is somewhat loculated.  I reviewed the images.  I discussed the 

imaging with Dr. Radford radiology and he does not feel drainage would be 

beneficial at this time due to loculations.  I think this is very reasonable as 

hopefully IV antibiotics alone will be able to treat the abscesses.  Had a 

preliminary discussion with the patient about the likely need for sigmoid 

resection electively in the next couple of months.  Hopefully, he will get 

through this episode without any operation.  He does understand that if he is 

to worsen clinically he may require surgery.  Continue clear liquids at this 

time repeat labs and evaluation in the morning.











Elroy Leslie MD Mar 10, 2018 10:42

## 2018-03-11 VITALS
RESPIRATION RATE: 20 BRPM | OXYGEN SATURATION: 92 % | HEART RATE: 67 BPM | DIASTOLIC BLOOD PRESSURE: 81 MMHG | TEMPERATURE: 97.9 F | SYSTOLIC BLOOD PRESSURE: 128 MMHG

## 2018-03-11 VITALS
SYSTOLIC BLOOD PRESSURE: 142 MMHG | TEMPERATURE: 96.6 F | RESPIRATION RATE: 24 BRPM | DIASTOLIC BLOOD PRESSURE: 87 MMHG | HEART RATE: 68 BPM | OXYGEN SATURATION: 92 %

## 2018-03-11 VITALS
TEMPERATURE: 96.5 F | RESPIRATION RATE: 20 BRPM | DIASTOLIC BLOOD PRESSURE: 91 MMHG | OXYGEN SATURATION: 93 % | SYSTOLIC BLOOD PRESSURE: 140 MMHG | HEART RATE: 66 BPM

## 2018-03-11 VITALS
DIASTOLIC BLOOD PRESSURE: 93 MMHG | TEMPERATURE: 96.6 F | SYSTOLIC BLOOD PRESSURE: 152 MMHG | RESPIRATION RATE: 20 BRPM | OXYGEN SATURATION: 93 % | HEART RATE: 64 BPM

## 2018-03-11 VITALS
HEART RATE: 64 BPM | SYSTOLIC BLOOD PRESSURE: 182 MMHG | RESPIRATION RATE: 20 BRPM | DIASTOLIC BLOOD PRESSURE: 96 MMHG | OXYGEN SATURATION: 94 % | TEMPERATURE: 96.6 F

## 2018-03-11 LAB
ALBUMIN SERPL-MCNC: 2.5 GM/DL (ref 3.4–5)
ALP SERPL-CCNC: 71 U/L (ref 45–117)
ALT SERPL-CCNC: 13 U/L (ref 12–78)
AST SERPL-CCNC: 7 U/L (ref 15–37)
BASOPHILS # BLD AUTO: 0 TH/MM3 (ref 0–0.2)
BASOPHILS NFR BLD: 0.1 % (ref 0–2)
BILIRUB SERPL-MCNC: 0.5 MG/DL (ref 0.2–1)
BUN SERPL-MCNC: 15 MG/DL (ref 7–18)
CALCIUM SERPL-MCNC: 9 MG/DL (ref 8.5–10.1)
CHLORIDE SERPL-SCNC: 101 MEQ/L (ref 98–107)
CREAT SERPL-MCNC: 0.87 MG/DL (ref 0.6–1.3)
EOSINOPHIL # BLD: 0 TH/MM3 (ref 0–0.4)
EOSINOPHIL NFR BLD: 0.1 % (ref 0–4)
ERYTHROCYTE [DISTWIDTH] IN BLOOD BY AUTOMATED COUNT: 11.9 % (ref 11.6–17.2)
GFR SERPLBLD BASED ON 1.73 SQ M-ARVRAT: 91 ML/MIN (ref 89–?)
GLUCOSE SERPL-MCNC: 141 MG/DL (ref 74–106)
HCO3 BLD-SCNC: 32 MEQ/L (ref 21–32)
HCT VFR BLD CALC: 41.1 % (ref 39–51)
HGB BLD-MCNC: 13.4 GM/DL (ref 13–17)
LYMPHOCYTES # BLD AUTO: 0.5 TH/MM3 (ref 1–4.8)
LYMPHOCYTES NFR BLD AUTO: 3.5 % (ref 9–44)
MCH RBC QN AUTO: 30.6 PG (ref 27–34)
MCHC RBC AUTO-ENTMCNC: 32.7 % (ref 32–36)
MCV RBC AUTO: 93.6 FL (ref 80–100)
MONOCYTE #: 0.5 TH/MM3 (ref 0–0.9)
MONOCYTES NFR BLD: 4 % (ref 0–8)
NEUTROPHILS # BLD AUTO: 12.6 TH/MM3 (ref 1.8–7.7)
NEUTROPHILS NFR BLD AUTO: 92.3 % (ref 16–70)
PLATELET # BLD: 435 TH/MM3 (ref 150–450)
PMV BLD AUTO: 8.6 FL (ref 7–11)
PROT SERPL-MCNC: 6.3 GM/DL (ref 6.4–8.2)
RBC # BLD AUTO: 4.39 MIL/MM3 (ref 4.5–5.9)
SODIUM SERPL-SCNC: 140 MEQ/L (ref 136–145)
WBC # BLD AUTO: 13.6 TH/MM3 (ref 4–11)

## 2018-03-11 RX ADMIN — PHENYTOIN SODIUM SCH MLS/HR: 50 INJECTION INTRAMUSCULAR; INTRAVENOUS at 16:06

## 2018-03-11 RX ADMIN — SODIUM CHLORIDE SCH MLS/HR: 900 INJECTION, SOLUTION INTRAVENOUS at 10:52

## 2018-03-11 RX ADMIN — ONDANSETRON PRN MG: 2 INJECTION, SOLUTION INTRAMUSCULAR; INTRAVENOUS at 10:52

## 2018-03-11 RX ADMIN — MORPHINE SULFATE PRN MG: 2 INJECTION, SOLUTION INTRAMUSCULAR; INTRAVENOUS at 04:05

## 2018-03-11 RX ADMIN — ONDANSETRON PRN MG: 2 INJECTION, SOLUTION INTRAMUSCULAR; INTRAVENOUS at 21:07

## 2018-03-11 RX ADMIN — Medication SCH ML: at 20:20

## 2018-03-11 RX ADMIN — FAMOTIDINE SCH MG: 20 TABLET, FILM COATED ORAL at 20:21

## 2018-03-11 RX ADMIN — LEVOFLOXACIN SCH MLS/HR: 5 INJECTION, SOLUTION INTRAVENOUS at 04:04

## 2018-03-11 RX ADMIN — PHENYTOIN SODIUM SCH MLS/HR: 50 INJECTION INTRAMUSCULAR; INTRAVENOUS at 07:20

## 2018-03-11 RX ADMIN — Medication SCH ML: at 08:23

## 2018-03-11 RX ADMIN — STANDARDIZED SENNA CONCENTRATE AND DOCUSATE SODIUM SCH TAB: 8.6; 5 TABLET, FILM COATED ORAL at 20:21

## 2018-03-11 RX ADMIN — MORPHINE SULFATE PRN MG: 2 INJECTION, SOLUTION INTRAMUSCULAR; INTRAVENOUS at 07:35

## 2018-03-11 RX ADMIN — MORPHINE SULFATE PRN MG: 2 INJECTION, SOLUTION INTRAMUSCULAR; INTRAVENOUS at 18:25

## 2018-03-11 RX ADMIN — STANDARDIZED SENNA CONCENTRATE AND DOCUSATE SODIUM SCH TAB: 8.6; 5 TABLET, FILM COATED ORAL at 08:23

## 2018-03-11 RX ADMIN — LEVOTHYROXINE SODIUM SCH MCG: 100 TABLET ORAL at 04:04

## 2018-03-11 RX ADMIN — ONDANSETRON PRN MG: 2 INJECTION, SOLUTION INTRAMUSCULAR; INTRAVENOUS at 04:05

## 2018-03-11 RX ADMIN — SODIUM CHLORIDE SCH MLS/HR: 900 INJECTION, SOLUTION INTRAVENOUS at 16:06

## 2018-03-11 RX ADMIN — MORPHINE SULFATE PRN MG: 2 INJECTION, SOLUTION INTRAMUSCULAR; INTRAVENOUS at 22:01

## 2018-03-11 RX ADMIN — LEVOTHYROXINE SODIUM SCH MCG: 75 TABLET ORAL at 04:04

## 2018-03-11 RX ADMIN — MORPHINE SULFATE PRN MG: 2 INJECTION, SOLUTION INTRAMUSCULAR; INTRAVENOUS at 10:52

## 2018-03-11 RX ADMIN — SODIUM CHLORIDE SCH MLS/HR: 900 INJECTION, SOLUTION INTRAVENOUS at 22:01

## 2018-03-11 RX ADMIN — SODIUM CHLORIDE SCH MLS/HR: 900 INJECTION, SOLUTION INTRAVENOUS at 04:04

## 2018-03-11 RX ADMIN — MORPHINE SULFATE PRN MG: 2 INJECTION, SOLUTION INTRAMUSCULAR; INTRAVENOUS at 15:29

## 2018-03-11 NOTE — HHI.PR
Subjective


Remarks


Continued slight increase in white blood cell count.  Patient reports that his 

pain is still persistent.  No fever.





Objective





Vital Signs








  Date Time  Temp Pulse Resp B/P (MAP) Pulse Ox O2 Delivery O2 Flow Rate FiO2


 


3/11/18 08:00 96.6 68 24 142/87 (105) 92   


 


3/11/18 07:40   18     


 


3/11/18 03:04 97.9 67 20 128/81 (97) 92   


 


3/10/18 20:17 97.8 86 22 150/89 (109) 96   


 


3/10/18 17:35   16     


 


3/10/18 17:34   18     


 


3/10/18 16:00 99.3 73 18 132/76 (94) 92   














I/O      


 


 3/10/18 3/10/18 3/10/18 3/11/18 3/11/18 3/11/18





 07:00 15:00 23:00 07:00 15:00 23:00


 


Intake Total 480 ml 1810 ml 240 ml   


 


Output Total 450 ml 800 ml    


 


Balance 30 ml 1010 ml 240 ml   


 


      


 


Intake Oral 480 ml 960 ml 240 ml   


 


IV Total  850 ml    


 


Output Urine Total 450 ml 800 ml    


 


# Voids    2  


 


# Bowel Movements  1    








Result Diagram:  


3/11/18 0833                                                                   

             3/11/18 0833





Objective Remarks


GENERAL: NAD, A&Ox3


HEAD: Normocephalic. 


NECK: Supple, trachea midline. No lymphadenopathy.


EYES: No scleral icterus. No injection or drainage. 


CARDIOVASCULAR: Regular rate and rhythm without murmurs, gallops, or rubs. 


RESPIRATORY: Breath sounds equal bilaterally. No accessory muscle use.


GASTROINTESTINAL: Abdomen tender left lower quadrant with guarding.


MUSCULOSKELETAL: No cyanosis, or edema. 


SKIN: Warm and dry.


NEURO:  No focal neurological deficitis.





A/P


Problem List:  


(1) Acute diverticulitis


ICD Code:  K57.92 - Diverticulitis of intestine, part unspecified, without 

perforation or abscess without bleeding


Status:  Acute


(2) Intra-abdominal abscess


ICD Code:  K65.1 - Peritoneal abscess


Status:  Acute


Assessment and Plan


57-year-old male admitted secondary to acute diverticulitis with abscess





Labs reviewed.  White blood cell count since continues to trend up slightly.  

Electrolytes are stable.  Continue to monitor labs.  Labs ordered for further 

monitoring.  Repeat CT abdomen/pelvis ordered.





Acute diverticulitis


Intra-abdominal abscess


Diverticulosis


Prior history of diverticulitis


Continue Levaquin


Continue metronidazole


IV hydration


Sips of clear liquids


GI consulted


Surgery consulted


Monitor for any worsening clinical status





Hypothyroidism


Hold treatment for now


Resume treatment when patient taking by mouth again





Gastroesophageal reflux disease


Treatment on hold for now





Hyperlipidemia


Hold treatment for now





DVT prophylaxis


Tee Crespo MD Mar 11, 2018 12:24

## 2018-03-11 NOTE — RADRPT
EXAM DATE/TIME:  03/11/2018 11:28 

 

[facility] COMPARISON:     CT ABDOMEN & PELVIS W CONTRAST, March 09, 2018, 4:19.

 

INDICATIONS :     Persisting pain left lower quadrant area with abdominal distention. Evaluate for po
ssible changes.

                      

IV CONTRAST:     95 cc Omnipaque 350 (iohexol) IV 

 

ORAL CONTRAST:      No oral contrast ingested.

                      

RADIATION DOSE:     20.60 CTDIvol (mGy) 

 

MEDICAL HISTORY :     Diverticulitis. Gastroesophageal reflux disease. Hypercholesterolemia.

SURGICAL HISTORY :      None. 

ENCOUNTER:      Initial

ACUITY:      1 week

PAIN SCALE:      8/10

LOCATION:       Left lower quadrant 

 

TECHNIQUE:     Volumetric scanning of the abdomen and pelvis was performed.  Using automated exposure
 control and adjustment of the mA and/or kV according to patient size, radiation dose was kept as low
 as reasonably achievable to obtain optimal diagnostic quality images.  DICOM format image data is av
ailable electronically for review and comparison.  

 

FINDINGS:     

LOWER LUNGS:     The visualized lower lungs are clear.

LIVER:     Homogeneous density without lesion.  There is no dilation of the biliary tree.  No calcifi
ed gallstones.

SPLEEN:     Normal size without lesion.

PANCREAS:     Within normal limits.

KIDNEYS:     Normal in size and shape.  There is no mass, stone or hydronephrosis.

ADRENAL GLANDS:     Within normal limits.

VASCULAR:     There is no aortic aneurysm.

BOWEL/MESENTERY:     Acute inflammatory process involving the proximal sigmoid colon is again noted. 
A small multiloculated pericolonic abscess beneath the anterior pelvic wall is again identified. Ther
e is no single large loculation which at this point would be amenable to drainage. Generalized intest
inal ileus is slightly worse but slightly further distention and fluid accumulation within the small 
intestinal tract. Small amount of free fluid also has increased.

ABDOMINAL WALL:     Within normal limits.

RETROPERITONEUM:     There is no lymphadenopathy. 

BLADDER:     No wall thickening or mass. 

REPRODUCTIVE:     Within normal limits.

INGUINAL:     There is no lymphadenopathy or hernia. 

MUSCULOSKELETAL:     Within normal limits for patient age. 

 

CONCLUSION:     

1. Acute diverticulitis in the proximal sigmoid colon is again noted.

2. Small pericolonic fluid collections characteristic of a diverticular abscess is again noted. There
 does not appear to be a single dominant cavity that would be amenable to percutaneous drainage at th
is time.

3. Increasing small bowel ileus.

4. Small amount of free fluid in the abdomen and pelvis which demonstrates slight increase since the 
previous day.

5. Otherwise stable exam.

1. 

 

 

 Thomas Radford MD on March 11, 2018 at 12:04           

Board Certified Radiologist.

 This report was verified electronically.

## 2018-03-11 NOTE — HHI.PR
__________________________________________________





Subjective


Subjective Notes


Persistent lower abdominal pain and worsening bloating.





Objective


Vitals/I&O





Vital Signs








  Date Time  Temp Pulse Resp B/P (MAP) Pulse Ox O2 Delivery O2 Flow Rate FiO2


 


3/11/18 08:00 96.6 68 24 142/87 (105) 92   


 


3/9/18 05:16      Room Air  








Labs





Laboratory Tests








Test


  3/11/18


08:33


 


White Blood Count 13.6 


 


Red Blood Count 4.39 


 


Hemoglobin 13.4 


 


Hematocrit 41.1 


 


Mean Corpuscular Volume 93.6 


 


Mean Corpuscular Hemoglobin 30.6 


 


Mean Corpuscular Hemoglobin


Concent 32.7 


 


 


Red Cell Distribution Width 11.9 


 


Platelet Count 435 


 


Mean Platelet Volume 8.6 


 


Neutrophils (%) (Auto) 92.3 


 


Lymphocytes (%) (Auto) 3.5 


 


Monocytes (%) (Auto) 4.0 


 


Eosinophils (%) (Auto) 0.1 


 


Basophils (%) (Auto) 0.1 


 


Neutrophils # (Auto) 12.6 


 


Lymphocytes # (Auto) 0.5 


 


Monocytes # (Auto) 0.5 


 


Eosinophils # (Auto) 0.0 


 


Basophils # (Auto) 0.0 


 


CBC Comment DIFF FINAL 


 


Differential Comment  


 


Blood Urea Nitrogen 15 


 


Creatinine 0.87 


 


Random Glucose 141 


 


Total Protein 6.3 


 


Albumin 2.5 


 


Calcium Level 9.0 


 


Alkaline Phosphatase 71 


 


Aspartate Amino Transf


(AST/SGOT) 7 


 


 


Alanine Aminotransferase


(ALT/SGPT) 13 


 


 


Total Bilirubin 0.5 


 


Sodium Level 140 


 


Potassium Level 3.8 


 


Chloride Level 101 


 


Carbon Dioxide Level 32.0 


 


Anion Gap 7 


 


Estimat Glomerular Filtration


Rate 91 


 








Radiology





Last Impressions








Abdomen/Pelvis CT 3/9/18 0349 Signed





Impressions: 





 Service Date/Time:  Friday, March 9, 2018 04:19 - CONCLUSION:  Severe sigmoid 





 colitis, presumably diverticulitis. This is complicated by intramural 

abscesses 





 and also an extraluminal abscess in the left lower quadrant.     Nile Morrison MD 








Narrative Exam


Abd distended, tender in LLQ





A/P


Assessment and Plan


56 yo M with diverticulitis with abscess.





Stable but not improving. Has worsening distention. Persistent elevated WBCs.  

Will f/u CT a/p which was repeated today by medical team.











Elroy Leslie MD 11, 2018 11:56

## 2018-03-12 VITALS
TEMPERATURE: 98.1 F | HEART RATE: 59 BPM | RESPIRATION RATE: 20 BRPM | OXYGEN SATURATION: 92 % | DIASTOLIC BLOOD PRESSURE: 84 MMHG | SYSTOLIC BLOOD PRESSURE: 132 MMHG

## 2018-03-12 VITALS
RESPIRATION RATE: 20 BRPM | SYSTOLIC BLOOD PRESSURE: 148 MMHG | HEART RATE: 72 BPM | TEMPERATURE: 97.8 F | OXYGEN SATURATION: 96 % | DIASTOLIC BLOOD PRESSURE: 86 MMHG

## 2018-03-12 VITALS
SYSTOLIC BLOOD PRESSURE: 168 MMHG | HEART RATE: 60 BPM | RESPIRATION RATE: 20 BRPM | DIASTOLIC BLOOD PRESSURE: 96 MMHG | OXYGEN SATURATION: 94 % | TEMPERATURE: 97.1 F

## 2018-03-12 VITALS
DIASTOLIC BLOOD PRESSURE: 96 MMHG | OXYGEN SATURATION: 94 % | TEMPERATURE: 96.5 F | HEART RATE: 62 BPM | SYSTOLIC BLOOD PRESSURE: 145 MMHG | RESPIRATION RATE: 20 BRPM

## 2018-03-12 VITALS
RESPIRATION RATE: 20 BRPM | HEART RATE: 60 BPM | OXYGEN SATURATION: 93 % | SYSTOLIC BLOOD PRESSURE: 151 MMHG | TEMPERATURE: 98.6 F | DIASTOLIC BLOOD PRESSURE: 86 MMHG

## 2018-03-12 LAB
ALBUMIN SERPL-MCNC: 2.6 GM/DL (ref 3.4–5)
ALP SERPL-CCNC: 79 U/L (ref 45–117)
ALT SERPL-CCNC: 11 U/L (ref 12–78)
AST SERPL-CCNC: 7 U/L (ref 15–37)
BASOPHILS # BLD AUTO: 0 TH/MM3 (ref 0–0.2)
BASOPHILS NFR BLD: 0.1 % (ref 0–2)
BILIRUB SERPL-MCNC: 0.3 MG/DL (ref 0.2–1)
BUN SERPL-MCNC: 23 MG/DL (ref 7–18)
CALCIUM SERPL-MCNC: 8.7 MG/DL (ref 8.5–10.1)
CHLORIDE SERPL-SCNC: 101 MEQ/L (ref 98–107)
CREAT SERPL-MCNC: 0.9 MG/DL (ref 0.6–1.3)
EOSINOPHIL # BLD: 0 TH/MM3 (ref 0–0.4)
EOSINOPHIL NFR BLD: 0.1 % (ref 0–4)
ERYTHROCYTE [DISTWIDTH] IN BLOOD BY AUTOMATED COUNT: 11.9 % (ref 11.6–17.2)
GFR SERPLBLD BASED ON 1.73 SQ M-ARVRAT: 88 ML/MIN (ref 89–?)
GLUCOSE SERPL-MCNC: 146 MG/DL (ref 74–106)
HCO3 BLD-SCNC: 29.4 MEQ/L (ref 21–32)
HCT VFR BLD CALC: 44.3 % (ref 39–51)
HGB BLD-MCNC: 14.5 GM/DL (ref 13–17)
LYMPHOCYTES # BLD AUTO: 0.6 TH/MM3 (ref 1–4.8)
LYMPHOCYTES NFR BLD AUTO: 3.7 % (ref 9–44)
MCH RBC QN AUTO: 30.5 PG (ref 27–34)
MCHC RBC AUTO-ENTMCNC: 32.8 % (ref 32–36)
MCV RBC AUTO: 93.1 FL (ref 80–100)
MONOCYTE #: 0.9 TH/MM3 (ref 0–0.9)
MONOCYTES NFR BLD: 5.7 % (ref 0–8)
NEUTROPHILS # BLD AUTO: 13.6 TH/MM3 (ref 1.8–7.7)
NEUTROPHILS NFR BLD AUTO: 90.4 % (ref 16–70)
PLATELET # BLD: 532 TH/MM3 (ref 150–450)
PMV BLD AUTO: 8.7 FL (ref 7–11)
PROT SERPL-MCNC: 6.5 GM/DL (ref 6.4–8.2)
RBC # BLD AUTO: 4.76 MIL/MM3 (ref 4.5–5.9)
SODIUM SERPL-SCNC: 138 MEQ/L (ref 136–145)
WBC # BLD AUTO: 15.1 TH/MM3 (ref 4–11)

## 2018-03-12 RX ADMIN — PHENYTOIN SODIUM SCH MLS/HR: 50 INJECTION INTRAMUSCULAR; INTRAVENOUS at 12:42

## 2018-03-12 RX ADMIN — MORPHINE SULFATE PRN MG: 2 INJECTION, SOLUTION INTRAMUSCULAR; INTRAVENOUS at 01:49

## 2018-03-12 RX ADMIN — MORPHINE SULFATE PRN MG: 2 INJECTION, SOLUTION INTRAMUSCULAR; INTRAVENOUS at 18:50

## 2018-03-12 RX ADMIN — LEVOTHYROXINE SODIUM SCH MCG: 100 TABLET ORAL at 06:06

## 2018-03-12 RX ADMIN — MORPHINE SULFATE PRN MG: 2 INJECTION, SOLUTION INTRAMUSCULAR; INTRAVENOUS at 08:50

## 2018-03-12 RX ADMIN — MORPHINE SULFATE PRN MG: 2 INJECTION, SOLUTION INTRAMUSCULAR; INTRAVENOUS at 06:06

## 2018-03-12 RX ADMIN — TAZOBACTAM SODIUM AND PIPERACILLIN SODIUM SCH MLS/HR: 375; 3 INJECTION, SOLUTION INTRAVENOUS at 20:29

## 2018-03-12 RX ADMIN — Medication SCH ML: at 08:25

## 2018-03-12 RX ADMIN — PANTOPRAZOLE SCH MG: 40 TABLET, DELAYED RELEASE ORAL at 08:24

## 2018-03-12 RX ADMIN — MORPHINE SULFATE PRN MG: 2 INJECTION, SOLUTION INTRAMUSCULAR; INTRAVENOUS at 15:08

## 2018-03-12 RX ADMIN — MORPHINE SULFATE PRN MG: 2 INJECTION, SOLUTION INTRAMUSCULAR; INTRAVENOUS at 22:27

## 2018-03-12 RX ADMIN — STANDARDIZED SENNA CONCENTRATE AND DOCUSATE SODIUM SCH TAB: 8.6; 5 TABLET, FILM COATED ORAL at 08:25

## 2018-03-12 RX ADMIN — STANDARDIZED SENNA CONCENTRATE AND DOCUSATE SODIUM SCH TAB: 8.6; 5 TABLET, FILM COATED ORAL at 20:33

## 2018-03-12 RX ADMIN — SODIUM CHLORIDE SCH MLS/HR: 900 INJECTION, SOLUTION INTRAVENOUS at 17:05

## 2018-03-12 RX ADMIN — FAMOTIDINE SCH MG: 20 TABLET, FILM COATED ORAL at 20:33

## 2018-03-12 RX ADMIN — LEVOFLOXACIN SCH MLS/HR: 5 INJECTION, SOLUTION INTRAVENOUS at 06:06

## 2018-03-12 RX ADMIN — PHENYTOIN SODIUM SCH MLS/HR: 50 INJECTION INTRAMUSCULAR; INTRAVENOUS at 03:35

## 2018-03-12 RX ADMIN — SODIUM CHLORIDE SCH MLS/HR: 900 INJECTION, SOLUTION INTRAVENOUS at 03:35

## 2018-03-12 RX ADMIN — STANDARDIZED SENNA CONCENTRATE AND DOCUSATE SODIUM SCH TAB: 8.6; 5 TABLET, FILM COATED ORAL at 21:00

## 2018-03-12 RX ADMIN — LEVOTHYROXINE SODIUM SCH MCG: 75 TABLET ORAL at 06:06

## 2018-03-12 RX ADMIN — SODIUM CHLORIDE SCH MLS/HR: 900 INJECTION, SOLUTION INTRAVENOUS at 10:14

## 2018-03-12 RX ADMIN — STANDARDIZED SENNA CONCENTRATE AND DOCUSATE SODIUM SCH TAB: 8.6; 5 TABLET, FILM COATED ORAL at 20:29

## 2018-03-12 RX ADMIN — Medication SCH ML: at 20:31

## 2018-03-12 RX ADMIN — FAMOTIDINE SCH MG: 20 TABLET, FILM COATED ORAL at 08:29

## 2018-03-12 RX ADMIN — FAMOTIDINE SCH MG: 20 TABLET, FILM COATED ORAL at 08:24

## 2018-03-12 NOTE — HHI.PR
__________________________________________________





Subjective


Subjective Notes


Patient says he feels slightly better; no flatus; mild nausea but no emesis. 

Pain is slightly better and he feels as if he is close to passing flatus and 

having a BM. He has occasional hiccups and belching. No other complaints.





Objective


Vitals/I&O





Vital Signs








  Date Time  Temp Pulse Resp B/P (MAP) Pulse Ox O2 Delivery O2 Flow Rate FiO2


 


3/12/18 00:00 97.1 60 20 168/96 (120) 94   


 


3/9/18 05:16      Room Air  








Labs





Laboratory Tests








Test


  3/11/18


08:33 3/12/18


06:15


 


White Blood Count 13.6  15.1 


 


Red Blood Count 4.39  4.76 


 


Hemoglobin 13.4  14.5 


 


Hematocrit 41.1  44.3 


 


Mean Corpuscular Volume 93.6  93.1 


 


Mean Corpuscular Hemoglobin 30.6  30.5 


 


Mean Corpuscular Hemoglobin


Concent 32.7 


  32.8 


 


 


Red Cell Distribution Width 11.9  11.9 


 


Platelet Count 435  532 


 


Mean Platelet Volume 8.6  8.7 


 


Neutrophils (%) (Auto) 92.3  90.4 


 


Lymphocytes (%) (Auto) 3.5  3.7 


 


Monocytes (%) (Auto) 4.0  5.7 


 


Eosinophils (%) (Auto) 0.1  0.1 


 


Basophils (%) (Auto) 0.1  0.1 


 


Neutrophils # (Auto) 12.6  13.6 


 


Lymphocytes # (Auto) 0.5  0.6 


 


Monocytes # (Auto) 0.5  0.9 


 


Eosinophils # (Auto) 0.0  0.0 


 


Basophils # (Auto) 0.0  0.0 


 


CBC Comment DIFF FINAL  DIFF FINAL 


 


Differential Comment    


 


Blood Urea Nitrogen 15  23 


 


Creatinine 0.87  0.90 


 


Random Glucose 141  146 


 


Total Protein 6.3  6.5 


 


Albumin 2.5  2.6 


 


Calcium Level 9.0  8.7 


 


Alkaline Phosphatase 71  79 


 


Aspartate Amino Transf


(AST/SGOT) 7 


  7 


 


 


Alanine Aminotransferase


(ALT/SGPT) 13 


  11 


 


 


Total Bilirubin 0.5  0.3 


 


Sodium Level 140  138 


 


Potassium Level 3.8  3.8 


 


Chloride Level 101  101 


 


Carbon Dioxide Level 32.0  29.4 


 


Anion Gap 7  8 


 


Estimat Glomerular Filtration


Rate 91 


  88 


 








Radiology





Last Impressions








Abdomen/Pelvis CT 3/9/18 0349 Signed





Impressions: 





 Service Date/Time:  Friday, March 9, 2018 04:19 - CONCLUSION:  Severe sigmoid 





 colitis, presumably diverticulitis. This is complicated by intramural 

abscesses 





 and also an extraluminal abscess in the left lower quadrant.     Nile Morrison MD 








Cardiovascular:  Regular


Lungs:  Clear


Narrative Exam


Abdomen is distended; tender to palpation along left side, particularly LLQ. He 

has minimal guarding and no rebound.





A/P


Assessment and Plan


Acute diverticulitis; minimal improvement symptomatically, WBC slightly 

increased. 





Plan: Continue IV Antibiotics. Will recheck labs in AM.











Cb Suárez MD Mar 12, 2018 08:13

## 2018-03-12 NOTE — HHI.PR
Subjective


Remarks


CT scan of abdomen yesterday shows no perforation or progression of abscess.  

Inflammation is present, ileus is seen.





Objective





Vital Signs








  Date Time  Temp Pulse Resp B/P (MAP) Pulse Ox O2 Delivery O2 Flow Rate FiO2


 


3/12/18 11:50 97.8 60 20 148/86 (106) 93   


 


3/12/18 07:50 96.5 62 20 145/96 (112) 94   


 


3/12/18 00:00 97.1 60 20 168/96 (120) 94   


 


3/11/18 20:00 96.6 64 20 182/96 (124) 94   


 


3/11/18 16:00 96.6 64 20 152/93 (112) 93   














I/O      


 


 3/11/18 3/11/18 3/11/18 3/12/18 3/12/18 3/12/18





 07:00 15:00 23:00 07:00 15:00 23:00


 


Intake Total  960 ml 240 ml 1200 ml  


 


Balance  960 ml 240 ml 1200 ml  


 


      


 


Intake Oral  960 ml 240 ml   


 


IV Total    1200 ml  


 


# Voids 2 2    








Result Diagram:  


3/12/18 0615                                                                   

             3/12/18 0615





Objective Remarks


GENERAL: NAD, A&Ox3


HEAD: Normocephalic. 


NECK: Supple, trachea midline. No lymphadenopathy.


EYES: No scleral icterus. No injection or drainage. 


CARDIOVASCULAR: Regular rate and rhythm without murmurs, gallops, or rubs. 


RESPIRATORY: Breath sounds equal bilaterally. No accessory muscle use.


GASTROINTESTINAL: Abdomen tender left lower quadrant with guarding.


MUSCULOSKELETAL: No cyanosis, or edema. 


SKIN: Warm and dry.


NEURO:  No focal neurological deficitis.





A/P


Problem List:  


(1) Acute diverticulitis


ICD Code:  K57.92 - Diverticulitis of intestine, part unspecified, without 

perforation or abscess without bleeding


Status:  Acute


(2) Intra-abdominal abscess


ICD Code:  K65.1 - Peritoneal abscess


Status:  Acute


Assessment and Plan


57-year-old male admitted secondary to acute diverticulitis with abscess





Labs reviewed.  Slight upper trend white blood cell count.  Continue to monitor 

labs.  Labs ordered for further monitoring.  NG tube is being considered.  

There may be a strong component for inflammatory response independent of 

infection.





Acute diverticulitis


Ileus


Intra-abdominal abscess


Diverticulosis


Prior history of diverticulitis


Continue Levaquin


Continue metronidazole


IV hydration


Sips of clear liquids


GI consulted


Surgery consulted


Monitor for any worsening clinical status





Hypothyroidism


Hold treatment for now


Resume treatment when patient taking by mouth again





Gastroesophageal reflux disease


Treatment on hold for now





Hyperlipidemia


Hold treatment for now





DVT prophylaxis


LUISITOs











Tee Kinsey MD Mar 12, 2018 14:40

## 2018-03-12 NOTE — PD.CONS
HPI


History of Present Illness


This is a 57 year old male was admitted to the hospital through the ER on 

Thursday last week.  He developed worsening left lower abdominal pain after 

starting prep for his colonoscopy.  Workup in the ER showed presence of severe 

diverticulitis in the sigmoid colon associated with 2 intramural abscesses.  He 

was started on treatment with IV Flagyl and IV Levaquin.  Patient reports 

marginal improvement in symptoms of abdominal pain and bloating.  Serial WBC 

count have shown elevations since starting treatment.  Repeat CAT scan showed 

significant resolution of the intramural abscess.  He has symptoms of nausea, 

constipation.  He denies vomiting diarrhea GI bleeding jaundice fever or 

chills.  His last colonoscopy was 10 years ago.  It was unremarkable.  He has 

had recurrent episodes of lower abdominal pain improving with antibiotic 

treatment suggestive of recurrent episodes of acute diverticulitis.





PFSH


Past Medical History


Gastroesophageal reflux disease


Hypothyroidism


Hyperlipidemia


Diverticulosis


History of diverticulitis


Past Surgical History


Left ankle surgery


Coded Allergies:  


     No Known Allergies (Unverified  Allergy, Unknown, 3/9/18)


Medications


The MAR list was reviewed


Family History


Hyperlipidemia


Social History


Patient drinks alcohol occasionally


No illicit drug abuse


Smoking history is present, patient quit in 2017.





Review of Systems


Gastrointestinal:  COMPLAINS OF: Abdominal pain, Constipation, Nausea, Swelling 

of Abdomen, DENIES: Black stools, Bloody stools, Diarrhea, Vomiting, Difficulty 

Swallowing, Anorexia, Odynophagia, Heartburn, Hematemesis





GI Exam


Vitals I&O





Vital Signs








  Date Time  Temp Pulse Resp B/P (MAP) Pulse Ox O2 Delivery O2 Flow Rate FiO2


 


3/12/18 15:50 98.1 59 20 132/84 (100) 92   


 


3/12/18 15:13   18     


 


3/12/18 11:50 97.8 60 20 148/86 (106) 93   


 


3/12/18 07:50 96.5 62 20 145/96 (112) 94   


 


3/12/18 00:00 97.1 60 20 168/96 (120) 94   


 


3/11/18 20:00 96.6 64 20 182/96 (124) 94   














I/O      


 


 3/11/18 3/11/18 3/11/18 3/12/18 3/12/18 3/12/18





 07:00 15:00 23:00 07:00 15:00 23:00


 


Intake Total  960 ml 240 ml 1200 ml  285 ml


 


Output Total      300 ml


 


Balance  960 ml 240 ml 1200 ml  -15 ml


 


      


 


Intake Oral  960 ml 240 ml   285 ml


 


IV Total    1200 ml  


 


Output Urine Total      300 ml


 


# Voids 2 2    3


 


# Bowel Movements      0








Laboratory











Test


  3/12/18


06:15


 


White Blood Count 15.1 TH/MM3 


 


Red Blood Count 4.76 MIL/MM3 


 


Hemoglobin 14.5 GM/DL 


 


Hematocrit 44.3 % 


 


Mean Corpuscular Volume 93.1 FL 


 


Mean Corpuscular Hemoglobin 30.5 PG 


 


Mean Corpuscular Hemoglobin


Concent 32.8 % 


 


 


Red Cell Distribution Width 11.9 % 


 


Platelet Count 532 TH/MM3 


 


Mean Platelet Volume 8.7 FL 


 


Neutrophils (%) (Auto) 90.4 % 


 


Lymphocytes (%) (Auto) 3.7 % 


 


Monocytes (%) (Auto) 5.7 % 


 


Eosinophils (%) (Auto) 0.1 % 


 


Basophils (%) (Auto) 0.1 % 


 


Neutrophils # (Auto) 13.6 TH/MM3 


 


Lymphocytes # (Auto) 0.6 TH/MM3 


 


Monocytes # (Auto) 0.9 TH/MM3 


 


Eosinophils # (Auto) 0.0 TH/MM3 


 


Basophils # (Auto) 0.0 TH/MM3 


 


CBC Comment DIFF FINAL 


 


Differential Comment  


 


Blood Urea Nitrogen 23 MG/DL 


 


Creatinine 0.90 MG/DL 


 


Random Glucose 146 MG/DL 


 


Total Protein 6.5 GM/DL 


 


Albumin 2.6 GM/DL 


 


Calcium Level 8.7 MG/DL 


 


Alkaline Phosphatase 79 U/L 


 


Aspartate Amino Transf


(AST/SGOT) 7 U/L 


 


 


Alanine Aminotransferase


(ALT/SGPT) 11 U/L 


 


 


Total Bilirubin 0.3 MG/DL 


 


Sodium Level 138 MEQ/L 


 


Potassium Level 3.8 MEQ/L 


 


Chloride Level 101 MEQ/L 


 


Carbon Dioxide Level 29.4 MEQ/L 


 


Anion Gap 8 MEQ/L 


 


Estimat Glomerular Filtration


Rate 88 ML/MIN 


 








Physical Examination


HEENT: Pupils round and reactive to light; normocephalic; atraumatic; no 

jaundice.  Throat is clear.


NECK: Neck is supple, no JVD, no lymphadenopathy.


CHEST:  Chest is clear to auscultation and percussion.


CARDIAC:  Regular rate and rhythm with no murmur gallop or rubs.


ABDOMEN: Diffusely tender.  Guarding present.  Rebound tenderness present no 

rigidity.  No masses palpable.  Bowel sounds present.


EXTREMITIES: No clubbing, cyanosis, or edema.


SKIN:  Normal; no rash; no jaundice.


CNS:  No focal deficits; alert and oriented times three.





Assessment and Plan


Assessment:  


(1) Intra-abdominal abscess


ICD Codes:  K65.1 - Peritoneal abscess


Status:  Acute


(2) Acute diverticulitis


ICD Codes:  K57.92 - Diverticulitis of intestine, part unspecified, without 

perforation or abscess without bleeding


Status:  Acute


Plan


1.  Severe diverticulitis of the sigmoid colon with intramural abscesses and 

sepsis related ileus


2.  Partial response to current antibiotic treatment.


3.  Presence of rebound tenderness indicates peritonism.  Elevated white count 

consistent with sepsis.


4.  Start treatment with IV Zosyn 5.  Discontinue IV Levaquin and Flagyl 6.  

Monitor labs











Donnell Golden MD Mar 12, 2018 18:14

## 2018-03-13 VITALS
TEMPERATURE: 99.2 F | RESPIRATION RATE: 16 BRPM | HEART RATE: 52 BPM | OXYGEN SATURATION: 95 % | SYSTOLIC BLOOD PRESSURE: 149 MMHG | DIASTOLIC BLOOD PRESSURE: 93 MMHG

## 2018-03-13 VITALS
DIASTOLIC BLOOD PRESSURE: 96 MMHG | OXYGEN SATURATION: 96 % | SYSTOLIC BLOOD PRESSURE: 178 MMHG | TEMPERATURE: 96 F | RESPIRATION RATE: 20 BRPM | HEART RATE: 55 BPM

## 2018-03-13 VITALS
TEMPERATURE: 97.4 F | RESPIRATION RATE: 18 BRPM | SYSTOLIC BLOOD PRESSURE: 144 MMHG | HEART RATE: 50 BPM | DIASTOLIC BLOOD PRESSURE: 79 MMHG | OXYGEN SATURATION: 96 %

## 2018-03-13 VITALS
HEART RATE: 52 BPM | DIASTOLIC BLOOD PRESSURE: 81 MMHG | SYSTOLIC BLOOD PRESSURE: 139 MMHG | RESPIRATION RATE: 18 BRPM | OXYGEN SATURATION: 94 %

## 2018-03-13 VITALS
SYSTOLIC BLOOD PRESSURE: 140 MMHG | TEMPERATURE: 97 F | RESPIRATION RATE: 18 BRPM | DIASTOLIC BLOOD PRESSURE: 80 MMHG | HEART RATE: 54 BPM | OXYGEN SATURATION: 95 %

## 2018-03-13 LAB
ALBUMIN SERPL-MCNC: 2.2 GM/DL (ref 3.4–5)
ALP SERPL-CCNC: 62 U/L (ref 45–117)
ALT SERPL-CCNC: 11 U/L (ref 12–78)
AST SERPL-CCNC: 7 U/L (ref 15–37)
BASOPHILS # BLD AUTO: 0 TH/MM3 (ref 0–0.2)
BASOPHILS NFR BLD: 0.1 % (ref 0–2)
BILIRUB SERPL-MCNC: 0.2 MG/DL (ref 0.2–1)
BUN SERPL-MCNC: 20 MG/DL (ref 7–18)
CALCIUM SERPL-MCNC: 7.9 MG/DL (ref 8.5–10.1)
CHLORIDE SERPL-SCNC: 107 MEQ/L (ref 98–107)
CREAT SERPL-MCNC: 0.88 MG/DL (ref 0.6–1.3)
EOSINOPHIL # BLD: 0 TH/MM3 (ref 0–0.4)
EOSINOPHIL NFR BLD: 0.1 % (ref 0–4)
ERYTHROCYTE [DISTWIDTH] IN BLOOD BY AUTOMATED COUNT: 12 % (ref 11.6–17.2)
GFR SERPLBLD BASED ON 1.73 SQ M-ARVRAT: 90 ML/MIN (ref 89–?)
GLUCOSE SERPL-MCNC: 110 MG/DL (ref 74–106)
HCO3 BLD-SCNC: 26.6 MEQ/L (ref 21–32)
HCT VFR BLD CALC: 37.3 % (ref 39–51)
HGB BLD-MCNC: 12.5 GM/DL (ref 13–17)
LYMPHOCYTES # BLD AUTO: 0.6 TH/MM3 (ref 1–4.8)
LYMPHOCYTES NFR BLD AUTO: 6.3 % (ref 9–44)
MCH RBC QN AUTO: 31.2 PG (ref 27–34)
MCHC RBC AUTO-ENTMCNC: 33.6 % (ref 32–36)
MCV RBC AUTO: 92.8 FL (ref 80–100)
MONOCYTE #: 0.8 TH/MM3 (ref 0–0.9)
MONOCYTES NFR BLD: 7.4 % (ref 0–8)
NEUTROPHILS # BLD AUTO: 8.9 TH/MM3 (ref 1.8–7.7)
NEUTROPHILS NFR BLD AUTO: 86.1 % (ref 16–70)
PLATELET # BLD: 373 TH/MM3 (ref 150–450)
PMV BLD AUTO: 8.2 FL (ref 7–11)
PROT SERPL-MCNC: 5.5 GM/DL (ref 6.4–8.2)
RBC # BLD AUTO: 4.02 MIL/MM3 (ref 4.5–5.9)
SODIUM SERPL-SCNC: 141 MEQ/L (ref 136–145)
WBC # BLD AUTO: 10.3 TH/MM3 (ref 4–11)

## 2018-03-13 RX ADMIN — TAZOBACTAM SODIUM AND PIPERACILLIN SODIUM SCH MLS/HR: 375; 3 INJECTION, SOLUTION INTRAVENOUS at 01:42

## 2018-03-13 RX ADMIN — TAZOBACTAM SODIUM AND PIPERACILLIN SODIUM SCH MLS/HR: 375; 3 INJECTION, SOLUTION INTRAVENOUS at 14:39

## 2018-03-13 RX ADMIN — LEVOTHYROXINE SODIUM SCH MCG: 75 TABLET ORAL at 04:53

## 2018-03-13 RX ADMIN — MORPHINE SULFATE PRN MG: 2 INJECTION, SOLUTION INTRAMUSCULAR; INTRAVENOUS at 11:33

## 2018-03-13 RX ADMIN — FAMOTIDINE SCH MG: 20 TABLET, FILM COATED ORAL at 21:00

## 2018-03-13 RX ADMIN — MORPHINE SULFATE PRN MG: 2 INJECTION, SOLUTION INTRAMUSCULAR; INTRAVENOUS at 17:58

## 2018-03-13 RX ADMIN — PANTOPRAZOLE SCH MG: 40 TABLET, DELAYED RELEASE ORAL at 07:48

## 2018-03-13 RX ADMIN — FAMOTIDINE SCH MG: 20 TABLET, FILM COATED ORAL at 07:48

## 2018-03-13 RX ADMIN — TAZOBACTAM SODIUM AND PIPERACILLIN SODIUM SCH MLS/HR: 375; 3 INJECTION, SOLUTION INTRAVENOUS at 19:51

## 2018-03-13 RX ADMIN — PHENYTOIN SODIUM SCH MLS/HR: 50 INJECTION INTRAMUSCULAR; INTRAVENOUS at 07:49

## 2018-03-13 RX ADMIN — TAZOBACTAM SODIUM AND PIPERACILLIN SODIUM SCH MLS/HR: 375; 3 INJECTION, SOLUTION INTRAVENOUS at 08:49

## 2018-03-13 RX ADMIN — STANDARDIZED SENNA CONCENTRATE AND DOCUSATE SODIUM SCH TAB: 8.6; 5 TABLET, FILM COATED ORAL at 07:49

## 2018-03-13 RX ADMIN — FAMOTIDINE SCH MG: 20 TABLET, FILM COATED ORAL at 08:03

## 2018-03-13 RX ADMIN — Medication SCH ML: at 07:48

## 2018-03-13 RX ADMIN — MORPHINE SULFATE PRN MG: 2 INJECTION, SOLUTION INTRAMUSCULAR; INTRAVENOUS at 01:42

## 2018-03-13 RX ADMIN — PHENYTOIN SODIUM SCH MLS/HR: 50 INJECTION INTRAMUSCULAR; INTRAVENOUS at 04:53

## 2018-03-13 RX ADMIN — MORPHINE SULFATE PRN MG: 2 INJECTION, SOLUTION INTRAMUSCULAR; INTRAVENOUS at 14:39

## 2018-03-13 RX ADMIN — MORPHINE SULFATE PRN MG: 2 INJECTION, SOLUTION INTRAMUSCULAR; INTRAVENOUS at 04:53

## 2018-03-13 RX ADMIN — LEVOTHYROXINE SODIUM SCH MCG: 100 TABLET ORAL at 04:53

## 2018-03-13 RX ADMIN — Medication SCH ML: at 21:10

## 2018-03-13 RX ADMIN — MORPHINE SULFATE PRN MG: 2 INJECTION, SOLUTION INTRAMUSCULAR; INTRAVENOUS at 21:09

## 2018-03-13 RX ADMIN — MORPHINE SULFATE PRN MG: 2 INJECTION, SOLUTION INTRAMUSCULAR; INTRAVENOUS at 07:49

## 2018-03-13 RX ADMIN — PHENYTOIN SODIUM SCH MLS/HR: 50 INJECTION INTRAMUSCULAR; INTRAVENOUS at 19:52

## 2018-03-13 NOTE — HHI.GIFU
Subjective


Remarks


The patient has made significant improvement since changing the antibiotic 

treatment IV Zosyn.  He reports improvement in pain, abdominal distention.  He 

has had multiple bowel movements.  There is no history of GI bleeding fever 

chills.





Objective


Vitals I&O





Vital Signs








  Date Time  Temp Pulse Resp B/P (MAP) Pulse Ox O2 Delivery O2 Flow Rate FiO2


 


3/13/18 12:00  52 18 139/81 (100) 94   


 


3/13/18 08:00 97.4 50 18 144/79 (100) 96   


 


3/13/18 00:00 96.0 55 20 178/96 (123) 96   


 


3/12/18 20:00 98.6 60 20 151/86 (107) 93   














I/O      


 


 3/12/18 3/12/18 3/12/18 3/13/18 3/13/18 3/13/18





 07:00 15:00 23:00 07:00 15:00 23:00


 


Intake Total 1200 ml  435 ml 570 ml  


 


Output Total   300 ml   850 ml


 


Balance 1200 ml  135 ml 570 ml  -850 ml


 


      


 


Intake Oral   285 ml 520 ml  


 


IV Total 1200 ml  150 ml 50 ml  


 


Output Urine Total   300 ml   850 ml


 


# Voids   3 3  


 


# Bowel Movements   0 2  








Laboratory





Laboratory Tests








Test


  3/13/18


06:20


 


White Blood Count 10.3 


 


Red Blood Count 4.02 


 


Hemoglobin 12.5 


 


Hematocrit 37.3 


 


Mean Corpuscular Volume 92.8 


 


Mean Corpuscular Hemoglobin 31.2 


 


Mean Corpuscular Hemoglobin


Concent 33.6 


 


 


Red Cell Distribution Width 12.0 


 


Platelet Count 373 


 


Mean Platelet Volume 8.2 


 


Neutrophils (%) (Auto) 86.1 


 


Lymphocytes (%) (Auto) 6.3 


 


Monocytes (%) (Auto) 7.4 


 


Eosinophils (%) (Auto) 0.1 


 


Basophils (%) (Auto) 0.1 


 


Neutrophils # (Auto) 8.9 


 


Lymphocytes # (Auto) 0.6 


 


Monocytes # (Auto) 0.8 


 


Eosinophils # (Auto) 0.0 


 


Basophils # (Auto) 0.0 


 


CBC Comment DIFF FINAL 


 


Differential Comment  


 


Blood Urea Nitrogen 20 


 


Creatinine 0.88 


 


Random Glucose 110 


 


Total Protein 5.5 


 


Albumin 2.2 


 


Calcium Level 7.9 


 


Alkaline Phosphatase 62 


 


Aspartate Amino Transf


(AST/SGOT) 7 


 


 


Alanine Aminotransferase


(ALT/SGPT) 11 


 


 


Total Bilirubin 0.2 


 


Sodium Level 141 


 


Potassium Level 3.7 


 


Chloride Level 107 


 


Carbon Dioxide Level 26.6 


 


Anion Gap 7 


 


Estimat Glomerular Filtration


Rate 90 


 








Physical Exam


HEENT: Pupils round and reactive to light; normocephalic; atraumatic; no 

jaundice.  Throat is clear.


NECK: Neck is supple, no JVD, no lymphadenopathy.


CHEST:  Chest is clear to auscultation and percussion.


CARDIAC:  Regular rate and rhythm with no murmur gallop or rubs.


ABDOMEN: Diffusely tender, mild guarding, no rigidity.  No masses palpable 

bowel sounds normal.


EXTREMITIES: No clubbing, cyanosis, or edema.


SKIN:  Normal; no rash; no jaundice.


CNS:  No focal deficits; alert and oriented times three.





Assessment and Plan


Assessment:  


(1) Intra-abdominal abscess


ICD Codes:  K65.1 - Peritoneal abscess


Status:  Acute


(2) Acute diverticulitis


ICD Codes:  K57.92 - Diverticulitis of intestine, part unspecified, without 

perforation or abscess without bleeding


Status:  Acute


Plan


1.  Severe diverticulitis of the sigmoid colon with intramural abscesses and 

sepsis related ileus..  Significant improvement on IV Zosyn


2.  Continue IV Zosyn 3.  Allow full liquid diet 4.  Repeat CT scan prior to 

discharge 5.  Plan to do colonoscopy 4-6 weeks after discharge











Donnell Golden MD Mar 13, 2018 18:54

## 2018-03-13 NOTE — HHI.PR
__________________________________________________





Subjective


Subjective Notes


Patient states that he feels better today.  He has less pain and has passed 

flatus as well as having had at least 2 bowel movements in the last couple of 

days.





Objective


Vitals/I&O





Vital Signs








  Date Time  Temp Pulse Resp B/P (MAP) Pulse Ox O2 Delivery O2 Flow Rate FiO2


 


3/13/18 12:00  52 18 139/81 (100) 94   


 


3/13/18 08:00 97.4       








Labs





Laboratory Tests








Test


  3/13/18


06:20


 


White Blood Count 10.3 


 


Red Blood Count 4.02 


 


Hemoglobin 12.5 


 


Hematocrit 37.3 


 


Mean Corpuscular Volume 92.8 


 


Mean Corpuscular Hemoglobin 31.2 


 


Mean Corpuscular Hemoglobin


Concent 33.6 


 


 


Red Cell Distribution Width 12.0 


 


Platelet Count 373 


 


Mean Platelet Volume 8.2 


 


Neutrophils (%) (Auto) 86.1 


 


Lymphocytes (%) (Auto) 6.3 


 


Monocytes (%) (Auto) 7.4 


 


Eosinophils (%) (Auto) 0.1 


 


Basophils (%) (Auto) 0.1 


 


Neutrophils # (Auto) 8.9 


 


Lymphocytes # (Auto) 0.6 


 


Monocytes # (Auto) 0.8 


 


Eosinophils # (Auto) 0.0 


 


Basophils # (Auto) 0.0 


 


CBC Comment DIFF FINAL 


 


Differential Comment  


 


Blood Urea Nitrogen 20 


 


Creatinine 0.88 


 


Random Glucose 110 


 


Total Protein 5.5 


 


Albumin 2.2 


 


Calcium Level 7.9 


 


Alkaline Phosphatase 62 


 


Aspartate Amino Transf


(AST/SGOT) 7 


 


 


Alanine Aminotransferase


(ALT/SGPT) 11 


 


 


Total Bilirubin 0.2 


 


Sodium Level 141 


 


Potassium Level 3.7 


 


Chloride Level 107 


 


Carbon Dioxide Level 26.6 


 


Anion Gap 7 


 


Estimat Glomerular Filtration


Rate 90 


 








Radiology





Last Impressions








Abdomen/Pelvis CT 3/9/18 0349 Signed





Impressions: 





 Service Date/Time:  Friday, March 9, 2018 04:19 - CONCLUSION:  Severe sigmoid 





 colitis, presumably diverticulitis. This is complicated by intramural 

abscesses 





 and also an extraluminal abscess in the left lower quadrant.     Nile Morrison MD 








Cardiovascular:  Regular


Lungs:  Clear


Narrative Exam


The abdomen is less distended.  He still has tenderness in the lower abdomen 

but it is improved.  He has no guarding or rebound.





A/P


Assessment and Plan


Impression: Acute diverticulitis.  He has made significant improvement over the 

last 24 hours with decreased pain and increased bowel function.  His white 

blood count is returned to normal.











Plan: His diet will be gradually advanced and IV antibiotics will be continued.











Cb Suárez MD Mar 13, 2018 16:18

## 2018-03-13 NOTE — HHI.PR
Subjective


Remarks


Decreasing white blood cell count.  Patient also reports that he's had a bowel 

movement overnight.  He has decreased pain.  Decreased nausea.  Less distention.





Objective





Vital Signs








  Date Time  Temp Pulse Resp B/P (MAP) Pulse Ox O2 Delivery O2 Flow Rate FiO2


 


3/13/18 12:00  52 18 139/81 (100) 94   


 


3/13/18 08:00 97.4 50 18 144/79 (100) 96   


 


3/13/18 00:00 96.0 55 20 178/96 (123) 96   


 


3/12/18 20:00 98.6 60 20 151/86 (107) 93   


 


3/12/18 15:50 98.1 59 20 132/84 (100) 92   


 


3/12/18 15:13   18     














I/O      


 


 3/12/18 3/12/18 3/12/18 3/13/18 3/13/18 3/13/18





 07:00 15:00 23:00 07:00 15:00 23:00


 


Intake Total 1200 ml  435 ml 570 ml  


 


Output Total   300 ml   


 


Balance 1200 ml  135 ml 570 ml  


 


      


 


Intake Oral   285 ml 520 ml  


 


IV Total 1200 ml  150 ml 50 ml  


 


Output Urine Total   300 ml   


 


# Voids   3 3  


 


# Bowel Movements   0 2  








Result Diagram:  


3/13/18 0620                                                                   

             3/13/18 0620





Objective Remarks


GENERAL: NAD, A&Ox3


HEAD: Normocephalic. 


NECK: Supple, trachea midline. No lymphadenopathy.


EYES: No scleral icterus. No injection or drainage. 


CARDIOVASCULAR: Regular rate and rhythm without murmurs, gallops, or rubs. 


RESPIRATORY: Breath sounds equal bilaterally. No accessory muscle use.


GASTROINTESTINAL: Abdomen tender left lower quadrant, decrease guarding.


MUSCULOSKELETAL: No cyanosis, or edema. 


SKIN: Warm and dry.


NEURO:  No focal neurological deficitis.





A/P


Problem List:  


(1) Acute diverticulitis


ICD Code:  K57.92 - Diverticulitis of intestine, part unspecified, without 

perforation or abscess without bleeding


Status:  Acute


(2) Intra-abdominal abscess


ICD Code:  K65.1 - Peritoneal abscess


Status:  Acute


Assessment and Plan


57-year-old male admitted secondary to acute diverticulitis with abscess





Labs reviewed.  Continue to monitor labs.  Labs ordered for further monitoring.

  Interval improvement seen both clinically and his blood work.  Continue to 

monitor CBC.  Continue to monitor patient clinically.  No change antibiotic 

treatments.





Acute diverticulitis


Ileus


Intra-abdominal abscess


Diverticulosis


Prior history of diverticulitis


Continue Levaquin


Continue metronidazole


IV hydration


Sips of clear liquids


GI consulted


Surgery consulted


Monitor for any worsening clinical status





Hypothyroidism


Hold treatment for now


Resume treatment when patient taking by mouth again





Gastroesophageal reflux disease


Treatment on hold for now





Hyperlipidemia


Hold treatment for now





DVT prophylaxis


LUISITOs











Tee Kinsey MD Mar 13, 2018 14:42

## 2018-03-14 VITALS
RESPIRATION RATE: 18 BRPM | TEMPERATURE: 98 F | HEART RATE: 55 BPM | DIASTOLIC BLOOD PRESSURE: 85 MMHG | OXYGEN SATURATION: 95 % | SYSTOLIC BLOOD PRESSURE: 154 MMHG

## 2018-03-14 VITALS
RESPIRATION RATE: 14 BRPM | TEMPERATURE: 97.3 F | OXYGEN SATURATION: 93 % | DIASTOLIC BLOOD PRESSURE: 100 MMHG | HEART RATE: 63 BPM | SYSTOLIC BLOOD PRESSURE: 156 MMHG

## 2018-03-14 VITALS
RESPIRATION RATE: 14 BRPM | TEMPERATURE: 98.7 F | OXYGEN SATURATION: 95 % | SYSTOLIC BLOOD PRESSURE: 122 MMHG | HEART RATE: 86 BPM | DIASTOLIC BLOOD PRESSURE: 63 MMHG

## 2018-03-14 VITALS
RESPIRATION RATE: 14 BRPM | DIASTOLIC BLOOD PRESSURE: 98 MMHG | TEMPERATURE: 96.2 F | SYSTOLIC BLOOD PRESSURE: 170 MMHG | OXYGEN SATURATION: 94 % | HEART RATE: 51 BPM

## 2018-03-14 VITALS
HEART RATE: 59 BPM | SYSTOLIC BLOOD PRESSURE: 170 MMHG | OXYGEN SATURATION: 95 % | DIASTOLIC BLOOD PRESSURE: 86 MMHG | RESPIRATION RATE: 20 BRPM | TEMPERATURE: 97.8 F

## 2018-03-14 LAB
ALBUMIN SERPL-MCNC: 2.1 GM/DL (ref 3.4–5)
ALP SERPL-CCNC: 58 U/L (ref 45–117)
ALT SERPL-CCNC: 10 U/L (ref 12–78)
AST SERPL-CCNC: 8 U/L (ref 15–37)
BASOPHILS # BLD AUTO: 0 TH/MM3 (ref 0–0.2)
BASOPHILS NFR BLD: 0.1 % (ref 0–2)
BILIRUB SERPL-MCNC: 0.3 MG/DL (ref 0.2–1)
BUN SERPL-MCNC: 13 MG/DL (ref 7–18)
CALCIUM SERPL-MCNC: 7.8 MG/DL (ref 8.5–10.1)
CHLORIDE SERPL-SCNC: 103 MEQ/L (ref 98–107)
CREAT SERPL-MCNC: 0.73 MG/DL (ref 0.6–1.3)
EOSINOPHIL # BLD: 0 TH/MM3 (ref 0–0.4)
EOSINOPHIL NFR BLD: 0.3 % (ref 0–4)
ERYTHROCYTE [DISTWIDTH] IN BLOOD BY AUTOMATED COUNT: 12 % (ref 11.6–17.2)
GFR SERPLBLD BASED ON 1.73 SQ M-ARVRAT: 112 ML/MIN (ref 89–?)
GLUCOSE SERPL-MCNC: 97 MG/DL (ref 74–106)
HCO3 BLD-SCNC: 27.6 MEQ/L (ref 21–32)
HCT VFR BLD CALC: 38.3 % (ref 39–51)
HGB BLD-MCNC: 12.7 GM/DL (ref 13–17)
LYMPHOCYTES # BLD AUTO: 0.8 TH/MM3 (ref 1–4.8)
LYMPHOCYTES NFR BLD AUTO: 8.3 % (ref 9–44)
MCH RBC QN AUTO: 31 PG (ref 27–34)
MCHC RBC AUTO-ENTMCNC: 33.2 % (ref 32–36)
MCV RBC AUTO: 93.3 FL (ref 80–100)
MONOCYTE #: 0.8 TH/MM3 (ref 0–0.9)
MONOCYTES NFR BLD: 8.7 % (ref 0–8)
NEUTROPHILS # BLD AUTO: 8.2 TH/MM3 (ref 1.8–7.7)
NEUTROPHILS NFR BLD AUTO: 82.6 % (ref 16–70)
PLATELET # BLD: 322 TH/MM3 (ref 150–450)
PMV BLD AUTO: 8.4 FL (ref 7–11)
PROT SERPL-MCNC: 5.2 GM/DL (ref 6.4–8.2)
RBC # BLD AUTO: 4.1 MIL/MM3 (ref 4.5–5.9)
SODIUM SERPL-SCNC: 138 MEQ/L (ref 136–145)
WBC # BLD AUTO: 9.8 TH/MM3 (ref 4–11)

## 2018-03-14 RX ADMIN — LEVOTHYROXINE SODIUM SCH MCG: 75 TABLET ORAL at 05:12

## 2018-03-14 RX ADMIN — FAMOTIDINE SCH MG: 20 TABLET, FILM COATED ORAL at 09:32

## 2018-03-14 RX ADMIN — MORPHINE SULFATE PRN MG: 2 INJECTION, SOLUTION INTRAMUSCULAR; INTRAVENOUS at 07:34

## 2018-03-14 RX ADMIN — MORPHINE SULFATE PRN MG: 2 INJECTION, SOLUTION INTRAMUSCULAR; INTRAVENOUS at 03:23

## 2018-03-14 RX ADMIN — STANDARDIZED SENNA CONCENTRATE AND DOCUSATE SODIUM SCH TAB: 8.6; 5 TABLET, FILM COATED ORAL at 20:17

## 2018-03-14 RX ADMIN — MORPHINE SULFATE PRN MG: 2 INJECTION, SOLUTION INTRAMUSCULAR; INTRAVENOUS at 00:19

## 2018-03-14 RX ADMIN — TAZOBACTAM SODIUM AND PIPERACILLIN SODIUM SCH MLS/HR: 375; 3 INJECTION, SOLUTION INTRAVENOUS at 20:14

## 2018-03-14 RX ADMIN — MORPHINE SULFATE PRN MG: 2 INJECTION, SOLUTION INTRAMUSCULAR; INTRAVENOUS at 23:44

## 2018-03-14 RX ADMIN — PANTOPRAZOLE SCH MG: 40 TABLET, DELAYED RELEASE ORAL at 09:33

## 2018-03-14 RX ADMIN — STANDARDIZED SENNA CONCENTRATE AND DOCUSATE SODIUM SCH TAB: 8.6; 5 TABLET, FILM COATED ORAL at 09:34

## 2018-03-14 RX ADMIN — Medication SCH ML: at 20:16

## 2018-03-14 RX ADMIN — MORPHINE SULFATE PRN MG: 2 INJECTION, SOLUTION INTRAMUSCULAR; INTRAVENOUS at 14:33

## 2018-03-14 RX ADMIN — TAZOBACTAM SODIUM AND PIPERACILLIN SODIUM SCH MLS/HR: 375; 3 INJECTION, SOLUTION INTRAVENOUS at 02:17

## 2018-03-14 RX ADMIN — LEVOTHYROXINE SODIUM SCH MCG: 100 TABLET ORAL at 05:12

## 2018-03-14 RX ADMIN — FAMOTIDINE SCH MG: 20 TABLET, FILM COATED ORAL at 20:19

## 2018-03-14 RX ADMIN — Medication SCH ML: at 09:32

## 2018-03-14 RX ADMIN — MORPHINE SULFATE PRN MG: 2 INJECTION, SOLUTION INTRAMUSCULAR; INTRAVENOUS at 11:16

## 2018-03-14 RX ADMIN — TAZOBACTAM SODIUM AND PIPERACILLIN SODIUM SCH MLS/HR: 375; 3 INJECTION, SOLUTION INTRAVENOUS at 14:31

## 2018-03-14 RX ADMIN — TAZOBACTAM SODIUM AND PIPERACILLIN SODIUM SCH MLS/HR: 375; 3 INJECTION, SOLUTION INTRAVENOUS at 09:31

## 2018-03-14 RX ADMIN — Medication SCH APPLIC: at 20:17

## 2018-03-14 RX ADMIN — PHENYTOIN SODIUM SCH MLS/HR: 50 INJECTION INTRAMUSCULAR; INTRAVENOUS at 05:12

## 2018-03-14 NOTE — HHI.PR
Subjective


Remarks


White blood cell count remains stable.  Patient remains symptomatic and does 

not have an appetite.  Etiology for his symptoms appears to be more 

inflammatory than infectious at this point.





Objective





Vital Signs








  Date Time  Temp Pulse Resp B/P (MAP) Pulse Ox O2 Delivery O2 Flow Rate FiO2


 


3/14/18 08:00 98.7 86 14 122/63 (82) 95   


 


3/14/18 00:00 98.0 55 18 154/85 (108) 95   


 


3/13/18 20:00 99.2 52 16 149/93 (111) 95   


 


3/13/18 19:04 97.0 54 18 140/80 (100) 95   


 


3/13/18 12:00  52 18 139/81 (100) 94   














I/O      


 


 3/13/18 3/13/18 3/13/18 3/14/18 3/14/18 3/14/18





 07:00 15:00 23:00 07:00 15:00 23:00


 


Intake Total 570 ml  872 ml 2400 ml 118 ml 


 


Output Total   850 ml   


 


Balance 570 ml  22 ml 2400 ml 118 ml 


 


      


 


Intake Oral 520 ml   600 ml 118 ml 


 


IV Total 50 ml  872 ml 1800 ml  


 


Output Urine Total   850 ml   


 


# Voids 3   3  


 


# Bowel Movements 2     








Result Diagram:  


3/14/18 0710                                                                   

             3/14/18 0710





Objective Remarks


GENERAL: NAD, A&Ox3


HEAD: Normocephalic. 


NECK: Supple, trachea midline. No lymphadenopathy.


EYES: No scleral icterus. No injection or drainage. 


CARDIOVASCULAR: Regular rate and rhythm without murmurs, gallops, or rubs. 


RESPIRATORY: Breath sounds equal bilaterally. No accessory muscle use.


GASTROINTESTINAL: Abdomen tender left lower quadrant, decrease guarding.


MUSCULOSKELETAL: No cyanosis, or edema. 


SKIN: Warm and dry.


NEURO:  No focal neurological deficitis.





A/P


Problem List:  


(1) Acute diverticulitis


ICD Code:  K57.92 - Diverticulitis of intestine, part unspecified, without 

perforation or abscess without bleeding


Status:  Acute


(2) Intra-abdominal abscess


ICD Code:  K65.1 - Peritoneal abscess


Status:  Acute


Assessment and Plan


57-year-old male admitted secondary to acute diverticulitis with abscess





Labs reviewed.  White blood cell count remains within normal limits.  No signs 

of bleeding.  Continue to monitor labs.  Labs ordered for further monitoring.  

Degree of inflammation seems out of proportion to degree of infection.  And 

other low dose steroid dose is provided today, monitor for clinical improvement 

in symptoms.  Patient will have to have better by mouth intake prior to 

consideration for discharge.  Discontinue IV fluids right now.





Acute diverticulitis


Ileus


Intra-abdominal abscess


Diverticulosis


Prior history of diverticulitis


Continue Levaquin


Continue metronidazole


IV hydration


Sips of clear liquids


GI consulted


Surgery consulted


Monitor for any worsening clinical status





Hypothyroidism


Hold treatment for now


Resume treatment when patient taking by mouth again





Gastroesophageal reflux disease


Treatment on hold for now





Hyperlipidemia


Hold treatment for now





DVT prophylaxis


SCDs











Tee Kinsey MD Mar 14, 2018 10:52

## 2018-03-15 VITALS
HEART RATE: 61 BPM | TEMPERATURE: 96.9 F | RESPIRATION RATE: 18 BRPM | OXYGEN SATURATION: 94 % | SYSTOLIC BLOOD PRESSURE: 143 MMHG | DIASTOLIC BLOOD PRESSURE: 84 MMHG

## 2018-03-15 VITALS
HEART RATE: 61 BPM | SYSTOLIC BLOOD PRESSURE: 152 MMHG | TEMPERATURE: 98.1 F | RESPIRATION RATE: 20 BRPM | OXYGEN SATURATION: 94 % | DIASTOLIC BLOOD PRESSURE: 88 MMHG

## 2018-03-15 VITALS
TEMPERATURE: 98.1 F | RESPIRATION RATE: 18 BRPM | OXYGEN SATURATION: 96 % | SYSTOLIC BLOOD PRESSURE: 137 MMHG | DIASTOLIC BLOOD PRESSURE: 82 MMHG | HEART RATE: 57 BPM

## 2018-03-15 VITALS — OXYGEN SATURATION: 95 % | HEART RATE: 54 BPM | TEMPERATURE: 98.2 F | RESPIRATION RATE: 16 BRPM

## 2018-03-15 VITALS
SYSTOLIC BLOOD PRESSURE: 167 MMHG | HEART RATE: 59 BPM | OXYGEN SATURATION: 95 % | TEMPERATURE: 97.6 F | DIASTOLIC BLOOD PRESSURE: 73 MMHG | RESPIRATION RATE: 18 BRPM

## 2018-03-15 LAB
ALBUMIN SERPL-MCNC: 2.1 GM/DL (ref 3.4–5)
ALP SERPL-CCNC: 76 U/L (ref 45–117)
ALT SERPL-CCNC: 15 U/L (ref 12–78)
AST SERPL-CCNC: 19 U/L (ref 15–37)
BASOPHILS # BLD AUTO: 0 TH/MM3 (ref 0–0.2)
BASOPHILS NFR BLD: 0.2 % (ref 0–2)
BILIRUB SERPL-MCNC: 0.3 MG/DL (ref 0.2–1)
BUN SERPL-MCNC: 13 MG/DL (ref 7–18)
CALCIUM SERPL-MCNC: 7.8 MG/DL (ref 8.5–10.1)
CHLORIDE SERPL-SCNC: 101 MEQ/L (ref 98–107)
CREAT SERPL-MCNC: 0.73 MG/DL (ref 0.6–1.3)
EOSINOPHIL # BLD: 0.1 TH/MM3 (ref 0–0.4)
EOSINOPHIL NFR BLD: 0.5 % (ref 0–4)
ERYTHROCYTE [DISTWIDTH] IN BLOOD BY AUTOMATED COUNT: 11.9 % (ref 11.6–17.2)
GFR SERPLBLD BASED ON 1.73 SQ M-ARVRAT: 112 ML/MIN (ref 89–?)
GLUCOSE SERPL-MCNC: 114 MG/DL (ref 74–106)
HCO3 BLD-SCNC: 26.9 MEQ/L (ref 21–32)
HCT VFR BLD CALC: 38.1 % (ref 39–51)
HGB BLD-MCNC: 12.8 GM/DL (ref 13–17)
LYMPHOCYTES # BLD AUTO: 1.1 TH/MM3 (ref 1–4.8)
LYMPHOCYTES NFR BLD AUTO: 10.7 % (ref 9–44)
MCH RBC QN AUTO: 30.9 PG (ref 27–34)
MCHC RBC AUTO-ENTMCNC: 33.7 % (ref 32–36)
MCV RBC AUTO: 91.8 FL (ref 80–100)
MONOCYTE #: 1.1 TH/MM3 (ref 0–0.9)
MONOCYTES NFR BLD: 10.6 % (ref 0–8)
NEUTROPHILS # BLD AUTO: 7.8 TH/MM3 (ref 1.8–7.7)
NEUTROPHILS NFR BLD AUTO: 78 % (ref 16–70)
PLATELET # BLD: 331 TH/MM3 (ref 150–450)
PMV BLD AUTO: 8.4 FL (ref 7–11)
PROT SERPL-MCNC: 5.3 GM/DL (ref 6.4–8.2)
RBC # BLD AUTO: 4.15 MIL/MM3 (ref 4.5–5.9)
SODIUM SERPL-SCNC: 136 MEQ/L (ref 136–145)
WBC # BLD AUTO: 10.1 TH/MM3 (ref 4–11)

## 2018-03-15 RX ADMIN — Medication SCH TAB: at 11:26

## 2018-03-15 RX ADMIN — FAMOTIDINE SCH MG: 20 TABLET, FILM COATED ORAL at 21:35

## 2018-03-15 RX ADMIN — TAZOBACTAM SODIUM AND PIPERACILLIN SODIUM SCH MLS/HR: 375; 3 INJECTION, SOLUTION INTRAVENOUS at 01:35

## 2018-03-15 RX ADMIN — OXYCODONE HYDROCHLORIDE AND ACETAMINOPHEN PRN TAB: 10; 325 TABLET ORAL at 11:27

## 2018-03-15 RX ADMIN — FAMOTIDINE SCH MG: 20 TABLET, FILM COATED ORAL at 10:08

## 2018-03-15 RX ADMIN — Medication SCH ML: at 20:50

## 2018-03-15 RX ADMIN — Medication SCH APPLIC: at 10:10

## 2018-03-15 RX ADMIN — TAZOBACTAM SODIUM AND PIPERACILLIN SODIUM SCH MLS/HR: 375; 3 INJECTION, SOLUTION INTRAVENOUS at 10:14

## 2018-03-15 RX ADMIN — LEVOTHYROXINE SODIUM SCH MCG: 100 TABLET ORAL at 06:06

## 2018-03-15 RX ADMIN — OXYCODONE HYDROCHLORIDE AND ACETAMINOPHEN PRN TAB: 10; 325 TABLET ORAL at 17:03

## 2018-03-15 RX ADMIN — PANTOPRAZOLE SCH MG: 40 TABLET, DELAYED RELEASE ORAL at 10:08

## 2018-03-15 RX ADMIN — STANDARDIZED SENNA CONCENTRATE AND DOCUSATE SODIUM SCH TAB: 8.6; 5 TABLET, FILM COATED ORAL at 21:35

## 2018-03-15 RX ADMIN — Medication SCH APPLIC: at 21:00

## 2018-03-15 RX ADMIN — OXYCODONE HYDROCHLORIDE AND ACETAMINOPHEN PRN TAB: 10; 325 TABLET ORAL at 21:35

## 2018-03-15 RX ADMIN — TAZOBACTAM SODIUM AND PIPERACILLIN SODIUM SCH MLS/HR: 375; 3 INJECTION, SOLUTION INTRAVENOUS at 21:34

## 2018-03-15 RX ADMIN — Medication SCH ML: at 10:15

## 2018-03-15 RX ADMIN — MORPHINE SULFATE PRN MG: 2 INJECTION, SOLUTION INTRAMUSCULAR; INTRAVENOUS at 06:30

## 2018-03-15 RX ADMIN — TAZOBACTAM SODIUM AND PIPERACILLIN SODIUM SCH MLS/HR: 375; 3 INJECTION, SOLUTION INTRAVENOUS at 14:06

## 2018-03-15 RX ADMIN — STANDARDIZED SENNA CONCENTRATE AND DOCUSATE SODIUM SCH TAB: 8.6; 5 TABLET, FILM COATED ORAL at 10:08

## 2018-03-15 RX ADMIN — LEVOTHYROXINE SODIUM SCH MCG: 75 TABLET ORAL at 06:06

## 2018-03-15 RX ADMIN — Medication SCH TAB: at 17:05

## 2018-03-15 NOTE — HHI.PR
Subjective


Remarks


Poor PO intake.  Patient complaints of pain and has concerns about discharge in 

his present state.  No other complaints.





Objective





Vital Signs








  Date Time  Temp Pulse Resp B/P (MAP) Pulse Ox O2 Delivery O2 Flow Rate FiO2


 


3/15/18 08:00 98.1 57 18 137/82 (100) 96   


 


3/15/18 06:44   20     


 


3/15/18 00:00 98.2 54 16  95   


 


3/14/18 20:19 97.8 59 20 170/86 (114) 95   


 


3/14/18 16:00 96.2 51 14 170/98 (122) 94   


 


3/14/18 12:00 97.3 63 14 156/100 (118) 93   














I/O      


 


 3/14/18 3/14/18 3/14/18 3/15/18 3/15/18 3/15/18





 07:00 15:00 23:00 07:00 15:00 23:00


 


Intake Total 2400 ml 844 ml 290 ml   


 


Output Total    400 ml  


 


Balance 2400 ml 844 ml 290 ml -400 ml  


 


      


 


Intake Oral 600 ml 594 ml 240 ml   


 


IV Total 1800 ml 250 ml 50 ml   


 


Output Urine Total    400 ml  


 


# Voids 3  5   


 


# Bowel Movements   0   








Result Diagram:  


3/15/18 0640                                                                   

             3/15/18 0640





Objective Remarks


GENERAL: NAD, A&Ox3


HEAD: Normocephalic. 


NECK: Supple, trachea midline. No lymphadenopathy.


EYES: No scleral icterus. No injection or drainage. 


CARDIOVASCULAR: Regular rate and rhythm without murmurs, gallops, or rubs. 


RESPIRATORY: Breath sounds equal bilaterally. No accessory muscle use.


GASTROINTESTINAL: Abdomen tender left lower quadrant, decrease guarding.


MUSCULOSKELETAL: No cyanosis, or edema. 


SKIN: Warm and dry.


NEURO:  No focal neurological deficitis.





A/P


Problem List:  


(1) Acute diverticulitis


ICD Code:  K57.92 - Diverticulitis of intestine, part unspecified, without 

perforation or abscess without bleeding


Status:  Acute


(2) Intra-abdominal abscess


ICD Code:  K65.1 - Peritoneal abscess


Status:  Acute


Assessment and Plan


57-year-old male admitted secondary to acute diverticulitis with abscess





Advance diet.  Change to PO treatments.  Follow for improved PO intake.  

Discharge tomorrow if improvements continue.





Acute diverticulitis


Ileus


Intra-abdominal abscess


Diverticulosis


Prior history of diverticulitis


Continue Levaquin


Continue metronidazole


IV hydration


Sips of clear liquids


GI consulted


Surgery consulted


Monitor for any worsening clinical status





Hypothyroidism


Hold treatment for now


Resume treatment when patient taking by mouth again





Gastroesophageal reflux disease


Treatment on hold for now





Hyperlipidemia


Hold treatment for now





DVT prophylaxis


SCDs











Tee Kinsey MD Mar 15, 2018 10:17

## 2018-03-15 NOTE — HHI.PR
__________________________________________________





Subjective


Subjective Notes


He has had some mild improvement but still has fairly severe LLQ pain and is 

bloated.  Has had flatus and some small liquid bowel movts.





Objective


Vitals/I&O





Vital Signs








  Date Time  Temp Pulse Resp B/P (MAP) Pulse Ox O2 Delivery O2 Flow Rate FiO2


 


3/15/18 08:00 98.1 57 18 137/82 (100) 96   








Labs





Laboratory Tests








Test


  3/15/18


06:40


 


White Blood Count 10.1 


 


Red Blood Count 4.15 


 


Hemoglobin 12.8 


 


Hematocrit 38.1 


 


Mean Corpuscular Volume 91.8 


 


Mean Corpuscular Hemoglobin 30.9 


 


Mean Corpuscular Hemoglobin


Concent 33.7 


 


 


Red Cell Distribution Width 11.9 


 


Platelet Count 331 


 


Mean Platelet Volume 8.4 


 


Neutrophils (%) (Auto) 78.0 


 


Lymphocytes (%) (Auto) 10.7 


 


Monocytes (%) (Auto) 10.6 


 


Eosinophils (%) (Auto) 0.5 


 


Basophils (%) (Auto) 0.2 


 


Neutrophils # (Auto) 7.8 


 


Lymphocytes # (Auto) 1.1 


 


Monocytes # (Auto) 1.1 


 


Eosinophils # (Auto) 0.1 


 


Basophils # (Auto) 0.0 


 


CBC Comment DIFF FINAL 


 


Differential Comment  


 


Blood Urea Nitrogen 13 


 


Creatinine 0.73 


 


Random Glucose 114 


 


Total Protein 5.3 


 


Albumin 2.1 


 


Calcium Level 7.8 


 


Alkaline Phosphatase 76 


 


Aspartate Amino Transf


(AST/SGOT) 19 


 


 


Alanine Aminotransferase


(ALT/SGPT) 15 


 


 


Total Bilirubin 0.3 


 


Sodium Level 136 


 


Potassium Level 3.2 


 


Chloride Level 101 


 


Carbon Dioxide Level 26.9 


 


Anion Gap 8 


 


Estimat Glomerular Filtration


Rate 112 


 








Radiology





Last Impressions








Abdomen/Pelvis CT 3/9/18 0349 Signed





Impressions: 





 Service Date/Time:  Friday, March 9, 2018 04:19 - CONCLUSION:  Severe sigmoid 





 colitis, presumably diverticulitis. This is complicated by intramural 

abscesses 





 and also an extraluminal abscess in the left lower quadrant.     Nile Morrison MD 








Narrative Exam


Abd distended, tender in LLQ





A/P


Assessment and Plan


56 yo M with diverticulitis with abscess.  He has some improvement but overall 

is not getting  better quickly. Persistent distention and pain. He has a 

partial obstruction either from intramural abscess of the sigmoid colon or 

involved small bowel.  





Continue IV antibiotics.  I discussed in detail with the patient the surgical 

options at this point, which include possible colostomy, sigmoid resection, 

ileostomy, etc.  He still wishes to avoid surgery at this time. It will likely 

be a few more days before he improves, and if he continues to smolder and not 

improve significantly he may require surgery.   D/w Dr. Kinsey.











Elroy Leslie MD Mar 15, 2018 11:21

## 2018-03-16 VITALS
OXYGEN SATURATION: 95 % | HEART RATE: 59 BPM | DIASTOLIC BLOOD PRESSURE: 88 MMHG | TEMPERATURE: 97.9 F | RESPIRATION RATE: 18 BRPM | SYSTOLIC BLOOD PRESSURE: 159 MMHG

## 2018-03-16 VITALS
TEMPERATURE: 98.3 F | DIASTOLIC BLOOD PRESSURE: 98 MMHG | HEART RATE: 61 BPM | OXYGEN SATURATION: 99 % | RESPIRATION RATE: 20 BRPM | SYSTOLIC BLOOD PRESSURE: 170 MMHG

## 2018-03-16 VITALS
HEART RATE: 62 BPM | OXYGEN SATURATION: 97 % | DIASTOLIC BLOOD PRESSURE: 70 MMHG | TEMPERATURE: 97.4 F | SYSTOLIC BLOOD PRESSURE: 158 MMHG | RESPIRATION RATE: 18 BRPM

## 2018-03-16 LAB
ALBUMIN SERPL-MCNC: 2 GM/DL (ref 3.4–5)
ALP SERPL-CCNC: 79 U/L (ref 45–117)
ALT SERPL-CCNC: 18 U/L (ref 12–78)
AST SERPL-CCNC: 15 U/L (ref 15–37)
BASOPHILS # BLD AUTO: 0 TH/MM3 (ref 0–0.2)
BASOPHILS NFR BLD: 0.2 % (ref 0–2)
BILIRUB SERPL-MCNC: 0.4 MG/DL (ref 0.2–1)
BUN SERPL-MCNC: 10 MG/DL (ref 7–18)
CALCIUM SERPL-MCNC: 7.7 MG/DL (ref 8.5–10.1)
CHLORIDE SERPL-SCNC: 102 MEQ/L (ref 98–107)
CREAT SERPL-MCNC: 0.74 MG/DL (ref 0.6–1.3)
EOSINOPHIL # BLD: 0.1 TH/MM3 (ref 0–0.4)
EOSINOPHIL NFR BLD: 1 % (ref 0–4)
ERYTHROCYTE [DISTWIDTH] IN BLOOD BY AUTOMATED COUNT: 12.1 % (ref 11.6–17.2)
GFR SERPLBLD BASED ON 1.73 SQ M-ARVRAT: 110 ML/MIN (ref 89–?)
GLUCOSE SERPL-MCNC: 90 MG/DL (ref 74–106)
HCO3 BLD-SCNC: 26.6 MEQ/L (ref 21–32)
HCT VFR BLD CALC: 37.4 % (ref 39–51)
HGB BLD-MCNC: 12.8 GM/DL (ref 13–17)
LYMPHOCYTES # BLD AUTO: 1.6 TH/MM3 (ref 1–4.8)
LYMPHOCYTES NFR BLD AUTO: 17.8 % (ref 9–44)
MCH RBC QN AUTO: 31.4 PG (ref 27–34)
MCHC RBC AUTO-ENTMCNC: 34.1 % (ref 32–36)
MCV RBC AUTO: 92 FL (ref 80–100)
MONOCYTE #: 0.9 TH/MM3 (ref 0–0.9)
MONOCYTES NFR BLD: 10.2 % (ref 0–8)
NEUTROPHILS # BLD AUTO: 6.2 TH/MM3 (ref 1.8–7.7)
NEUTROPHILS NFR BLD AUTO: 70.8 % (ref 16–70)
PLATELET # BLD: 326 TH/MM3 (ref 150–450)
PMV BLD AUTO: 8.5 FL (ref 7–11)
PROT SERPL-MCNC: 5.1 GM/DL (ref 6.4–8.2)
RBC # BLD AUTO: 4.07 MIL/MM3 (ref 4.5–5.9)
SODIUM SERPL-SCNC: 137 MEQ/L (ref 136–145)
WBC # BLD AUTO: 8.8 TH/MM3 (ref 4–11)

## 2018-03-16 RX ADMIN — TAZOBACTAM SODIUM AND PIPERACILLIN SODIUM SCH MLS/HR: 375; 3 INJECTION, SOLUTION INTRAVENOUS at 09:28

## 2018-03-16 RX ADMIN — Medication SCH TAB: at 09:33

## 2018-03-16 RX ADMIN — Medication SCH TAB: at 11:42

## 2018-03-16 RX ADMIN — OXYCODONE HYDROCHLORIDE AND ACETAMINOPHEN PRN TAB: 10; 325 TABLET ORAL at 02:19

## 2018-03-16 RX ADMIN — Medication SCH ML: at 09:33

## 2018-03-16 RX ADMIN — Medication SCH APPLIC: at 09:00

## 2018-03-16 RX ADMIN — TAZOBACTAM SODIUM AND PIPERACILLIN SODIUM SCH MLS/HR: 375; 3 INJECTION, SOLUTION INTRAVENOUS at 02:20

## 2018-03-16 RX ADMIN — OXYCODONE HYDROCHLORIDE AND ACETAMINOPHEN PRN TAB: 10; 325 TABLET ORAL at 11:43

## 2018-03-16 RX ADMIN — LEVOTHYROXINE SODIUM SCH MCG: 100 TABLET ORAL at 05:51

## 2018-03-16 RX ADMIN — OXYCODONE HYDROCHLORIDE AND ACETAMINOPHEN PRN TAB: 10; 325 TABLET ORAL at 05:52

## 2018-03-16 RX ADMIN — PANTOPRAZOLE SCH MG: 40 TABLET, DELAYED RELEASE ORAL at 09:32

## 2018-03-16 RX ADMIN — FAMOTIDINE SCH MG: 20 TABLET, FILM COATED ORAL at 09:00

## 2018-03-16 RX ADMIN — STANDARDIZED SENNA CONCENTRATE AND DOCUSATE SODIUM SCH TAB: 8.6; 5 TABLET, FILM COATED ORAL at 09:32

## 2018-03-16 RX ADMIN — LEVOTHYROXINE SODIUM SCH MCG: 75 TABLET ORAL at 05:51

## 2018-03-16 NOTE — HHI.DS
__________________________________________________





Discharge Summary


Admission Date


Mar 9, 2018 at 16:52


Discharge Date:  Mar 16, 2018


Admitting Diagnosis





Severe diverticulitis with abscess formation





(1) Intra-abdominal abscess


ICD Code:  K65.1 - Peritoneal abscess


Diagnosis:  Principal


Status:  Acute


(2) Acute diverticulitis


ICD Code:  K57.92 - Diverticulitis of intestine, part unspecified, without 

perforation or abscess without bleeding


Diagnosis:  Principal


Status:  Acute


Procedures


None


Brief History - From Admission


Mr. Kenny is a 57 year old male.  He has a history of diverticulitis in the 

past.  He has been having LLQ pain and visited our ER in regards to this.  He 

is found to have reticulitis with abscess.  No evidence of perforation.  

Abscess sizes are approximately 2 cm and 3 cm.  Pain is fairly intense and has 

been controlled with IV morphine at this point.  Sepsis criteria are not 

present.  Patient reports that he's been off her surgery in regards to his 

diverticulitis in the past.  At that time he had declined surgery.  No other 

complaints at this time.  Other medical conditions include hyperlipidemia, 

gastroesophageal reflux disease, and hyperthyroidism.  She also has a history 

of left foot injury with surgical repair and is planning to have a revision of 

the hardware in his left foot soon.


CBC/BMP:  


3/16/18 0545                                                                   

             3/16/18 0545





Significant Findings





Laboratory Tests








Test


  3/14/18


07:10 3/15/18


06:40 3/16/18


05:45


 


Red Blood Count


  4.10 MIL/MM3


(4.50-5.90) 4.15 MIL/MM3


(4.50-5.90) 4.07 MIL/MM3


(4.50-5.90)


 


Hemoglobin


  12.7 GM/DL


(13.0-17.0) 12.8 GM/DL


(13.0-17.0) 12.8 GM/DL


(13.0-17.0)


 


Hematocrit


  38.3 %


(39.0-51.0) 38.1 %


(39.0-51.0) 37.4 %


(39.0-51.0)


 


Neutrophils (%) (Auto)


  82.6 %


(16.0-70.0) 78.0 %


(16.0-70.0) 70.8 %


(16.0-70.0)


 


Lymphocytes (%) (Auto)


  8.3 %


(9.0-44.0) 


  


 


 


Monocytes (%) (Auto)


  8.7 %


(0.0-8.0) 10.6 %


(0.0-8.0) 10.2 %


(0.0-8.0)


 


Neutrophils # (Auto)


  8.2 TH/MM3


(1.8-7.7) 7.8 TH/MM3


(1.8-7.7) 


 


 


Lymphocytes # (Auto)


  0.8 TH/MM3


(1.0-4.8) 


  


 


 


Total Protein


  5.2 GM/DL


(6.4-8.2) 5.3 GM/DL


(6.4-8.2) 5.1 GM/DL


(6.4-8.2)


 


Albumin


  2.1 GM/DL


(3.4-5.0) 2.1 GM/DL


(3.4-5.0) 2.0 GM/DL


(3.4-5.0)


 


Calcium Level


  7.8 MG/DL


(8.5-10.1) 7.8 MG/DL


(8.5-10.1) 7.7 MG/DL


(8.5-10.1)


 


Aspartate Amino Transf


(AST/SGOT) 8 U/L (15-37) 


  


  


 


 


Alanine Aminotransferase


(ALT/SGPT) 10 U/L (12-78) 


  


  


 


 


Potassium Level


  3.4 MEQ/L


(3.5-5.1) 3.2 MEQ/L


(3.5-5.1) 3.1 MEQ/L


(3.5-5.1)


 


Monocytes # (Auto)


  


  1.1 TH/MM3


(0-0.9) 


 


 


Random Glucose


  


  114 MG/DL


() 


 








Hospital Course


Mr. Kenny is a 57-year-old male.  He was admitted secondary to abdominal pain 

which resulted in a finding of diverticulitis with 2 abscesses.  Part of the 

abscess was intramural and patient had a significant degree of inflammation 

while here.  His recovery course was prolonged.  Nothing by mouth status and 

antibiotics were started initially.  He was slowly advanced in his diet and has 

had slow but gradual recovery.  Today he is tolerating a solid diet and able to 

control pain with by mouth treatments.  She will continue by mouth antibiotics 

for 10 more days and he is medically cleared and stable for discharge home 

today.


Pt Condition on Discharge:  Stable


Discharge Disposition:  Discharge Home


Discharge Time:  <= 30 minutes


Discharge Instructions


DIET: Follow Instructions for:  As Tolerated, No Restrictions


Activities you can perform:  Regular-No Restrictions


Follow up Referrals:  


Surgical - 10 Days with Elroy Leslie MD





New Medications:  


Docusate Sodium (Colace) 100 Mg Capsule


100 MG PO BID PRN for CONSTIPATION, #30 CAP 0 Refills





Levofloxacin (Levaquin) 750 Mg Tablet


750 MG PO DAILY for Infection, #10 TAB 0 Refills





Metronidazole (Flagyl) 500 Mg Tab


500 MG PO TID for Infection, #30 TAB 0 Refills





Lactobacillus Acidophilus (Acidophilus/l-Sporogenes) 35 Million Cell-25 Million 

Cell Tab


1 TAB PO TID for Probiotic, #30 TAB





Oxycodone HCl/Acetaminophen (Oxycodone-Acetaminophen 5-325) 5 Mg-325 Mg Tablet


1 TAB PO Q6HR PRN for Pain, #30 TAB





 


Continued Medications:  


Levothyroxine (Synthroid) 175 Mcg Tab


175 MCG PO DAILY for Thyroid, #30 TAB 0 Refills





Omeprazole Magnesium (Prilosec) 20 Mg Tab




















Tee Kinsey MD Mar 16, 2018 18:06

## 2018-03-16 NOTE — HHI.PR
__________________________________________________





Subjective


Subjective Notes


He feels much better today. He is tolerating some solid food without nausea or 

severe bloating and has flatus and a formed BM. Pain controlled on the percocet.





Objective


Vitals/I&O





Vital Signs








  Date Time  Temp Pulse Resp B/P (MAP) Pulse Ox O2 Delivery O2 Flow Rate FiO2


 


3/16/18 00:00 98.3 61 20 170/98 (122) 99   








Labs





Laboratory Tests








Test


  3/16/18


05:45


 


White Blood Count 8.8 


 


Red Blood Count 4.07 


 


Hemoglobin 12.8 


 


Hematocrit 37.4 


 


Mean Corpuscular Volume 92.0 


 


Mean Corpuscular Hemoglobin 31.4 


 


Mean Corpuscular Hemoglobin


Concent 34.1 


 


 


Red Cell Distribution Width 12.1 


 


Platelet Count 326 


 


Mean Platelet Volume 8.5 


 


Neutrophils (%) (Auto) 70.8 


 


Lymphocytes (%) (Auto) 17.8 


 


Monocytes (%) (Auto) 10.2 


 


Eosinophils (%) (Auto) 1.0 


 


Basophils (%) (Auto) 0.2 


 


Neutrophils # (Auto) 6.2 


 


Lymphocytes # (Auto) 1.6 


 


Monocytes # (Auto) 0.9 


 


Eosinophils # (Auto) 0.1 


 


Basophils # (Auto) 0.0 


 


CBC Comment DIFF FINAL 


 


Differential Comment  


 


Blood Urea Nitrogen 10 


 


Creatinine 0.74 


 


Random Glucose 90 


 


Total Protein 5.1 


 


Albumin 2.0 


 


Calcium Level 7.7 


 


Alkaline Phosphatase 79 


 


Aspartate Amino Transf


(AST/SGOT) 15 


 


 


Alanine Aminotransferase


(ALT/SGPT) 18 


 


 


Total Bilirubin 0.4 


 


Sodium Level 137 


 


Potassium Level 3.1 


 


Chloride Level 102 


 


Carbon Dioxide Level 26.6 


 


Anion Gap 8 


 


Estimat Glomerular Filtration


Rate 110 


 








Radiology





Last Impressions








Abdomen/Pelvis CT 3/9/18 0349 Signed





Impressions: 





 Service Date/Time:  Friday, March 9, 2018 04:19 - CONCLUSION:  Severe sigmoid 





 colitis, presumably diverticulitis. This is complicated by intramural 

abscesses 





 and also an extraluminal abscess in the left lower quadrant.     Nile Morrison MD 








Narrative Exam


Abd moderately distended, mildly tender in LLQ





A/P


Assessment and Plan


56 yo M with diverticulitis with abscess.  





He has had a lot of improvement since yesterday in both pain and bowel 

function.  He is clear for dc home today from my standpoint with 10 days of PO 

levaquin/flagyl.  He will follow up with me in about 10 days. He will call my 

office if fevers, vomiting, worsening pain, etc.  Will plan for likely sigmoid 

colectomy in 2-3 months.











Elroy Leslie MD Mar 16, 2018 09:24

## 2018-03-30 ENCOUNTER — HOSPITAL ENCOUNTER (INPATIENT)
Dept: HOSPITAL 17 - NEPE | Age: 58
LOS: 8 days | Discharge: HOME HEALTH | DRG: 330 | End: 2018-04-07
Attending: SURGERY | Admitting: SURGERY
Payer: COMMERCIAL

## 2018-03-30 VITALS
HEART RATE: 88 BPM | RESPIRATION RATE: 18 BRPM | DIASTOLIC BLOOD PRESSURE: 88 MMHG | OXYGEN SATURATION: 98 % | SYSTOLIC BLOOD PRESSURE: 122 MMHG

## 2018-03-30 VITALS
TEMPERATURE: 98 F | RESPIRATION RATE: 20 BRPM | SYSTOLIC BLOOD PRESSURE: 129 MMHG | OXYGEN SATURATION: 98 % | HEART RATE: 80 BPM | DIASTOLIC BLOOD PRESSURE: 77 MMHG

## 2018-03-30 VITALS
OXYGEN SATURATION: 100 % | RESPIRATION RATE: 19 BRPM | SYSTOLIC BLOOD PRESSURE: 125 MMHG | HEART RATE: 89 BPM | DIASTOLIC BLOOD PRESSURE: 75 MMHG | TEMPERATURE: 98.4 F

## 2018-03-30 VITALS
DIASTOLIC BLOOD PRESSURE: 86 MMHG | RESPIRATION RATE: 18 BRPM | SYSTOLIC BLOOD PRESSURE: 144 MMHG | HEART RATE: 85 BPM | OXYGEN SATURATION: 98 %

## 2018-03-30 VITALS — WEIGHT: 218.26 LBS | BODY MASS INDEX: 31.25 KG/M2 | HEIGHT: 70 IN

## 2018-03-30 VITALS
SYSTOLIC BLOOD PRESSURE: 122 MMHG | DIASTOLIC BLOOD PRESSURE: 86 MMHG | HEART RATE: 85 BPM | OXYGEN SATURATION: 96 % | RESPIRATION RATE: 16 BRPM

## 2018-03-30 VITALS — OXYGEN SATURATION: 98 % | RESPIRATION RATE: 22 BRPM

## 2018-03-30 VITALS
OXYGEN SATURATION: 95 % | DIASTOLIC BLOOD PRESSURE: 86 MMHG | RESPIRATION RATE: 16 BRPM | HEART RATE: 80 BPM | SYSTOLIC BLOOD PRESSURE: 122 MMHG

## 2018-03-30 DIAGNOSIS — Z87.891: ICD-10-CM

## 2018-03-30 DIAGNOSIS — K21.9: ICD-10-CM

## 2018-03-30 DIAGNOSIS — K42.0: ICD-10-CM

## 2018-03-30 DIAGNOSIS — G89.18: ICD-10-CM

## 2018-03-30 DIAGNOSIS — K57.20: Primary | ICD-10-CM

## 2018-03-30 LAB
ALBUMIN SERPL-MCNC: 2.9 GM/DL (ref 3.4–5)
ALP SERPL-CCNC: 92 U/L (ref 45–117)
ALT SERPL-CCNC: 35 U/L (ref 12–78)
AST SERPL-CCNC: 35 U/L (ref 15–37)
BASOPHILS # BLD AUTO: 0 TH/MM3 (ref 0–0.2)
BASOPHILS NFR BLD: 0.2 % (ref 0–2)
BILIRUB SERPL-MCNC: 0.5 MG/DL (ref 0.2–1)
BUN SERPL-MCNC: 15 MG/DL (ref 7–18)
CALCIUM SERPL-MCNC: 9 MG/DL (ref 8.5–10.1)
CHLORIDE SERPL-SCNC: 102 MEQ/L (ref 98–107)
COLOR UR: YELLOW
CREAT SERPL-MCNC: 1 MG/DL (ref 0.6–1.3)
EOSINOPHIL # BLD: 0 TH/MM3 (ref 0–0.4)
EOSINOPHIL NFR BLD: 0.2 % (ref 0–4)
ERYTHROCYTE [DISTWIDTH] IN BLOOD BY AUTOMATED COUNT: 13.3 % (ref 11.6–17.2)
GFR SERPLBLD BASED ON 1.73 SQ M-ARVRAT: 78 ML/MIN (ref 89–?)
GLUCOSE SERPL-MCNC: 105 MG/DL (ref 74–106)
GLUCOSE UR STRIP-MCNC: (no result) MG/DL
HCO3 BLD-SCNC: 21.6 MEQ/L (ref 21–32)
HCT VFR BLD CALC: 40.4 % (ref 39–51)
HGB BLD-MCNC: 13.7 GM/DL (ref 13–17)
HGB UR QL STRIP: (no result)
KETONES UR STRIP-MCNC: 10 MG/DL
LYMPHOCYTES # BLD AUTO: 1.4 TH/MM3 (ref 1–4.8)
LYMPHOCYTES NFR BLD AUTO: 11.2 % (ref 9–44)
MCH RBC QN AUTO: 30.6 PG (ref 27–34)
MCHC RBC AUTO-ENTMCNC: 33.8 % (ref 32–36)
MCV RBC AUTO: 90.4 FL (ref 80–100)
MONOCYTE #: 1 TH/MM3 (ref 0–0.9)
MONOCYTES NFR BLD: 8 % (ref 0–8)
NEUTROPHILS # BLD AUTO: 10 TH/MM3 (ref 1.8–7.7)
NEUTROPHILS NFR BLD AUTO: 80.4 % (ref 16–70)
NITRITE UR QL STRIP: (no result)
PLATELET # BLD: 512 TH/MM3 (ref 150–450)
PMV BLD AUTO: 8.2 FL (ref 7–11)
PROT SERPL-MCNC: 7 GM/DL (ref 6.4–8.2)
RBC # BLD AUTO: 4.47 MIL/MM3 (ref 4.5–5.9)
SODIUM SERPL-SCNC: 136 MEQ/L (ref 136–145)
SP GR UR STRIP: (no result) (ref 1–1.03)
URINE LEUKOCYTE ESTERASE: (no result)
WBC # BLD AUTO: 12.4 TH/MM3 (ref 4–11)

## 2018-03-30 PROCEDURE — 80053 COMPREHEN METABOLIC PANEL: CPT

## 2018-03-30 PROCEDURE — 99153 MOD SED SAME PHYS/QHP EA: CPT

## 2018-03-30 PROCEDURE — 94150 VITAL CAPACITY TEST: CPT

## 2018-03-30 PROCEDURE — 99152 MOD SED SAME PHYS/QHP 5/>YRS: CPT

## 2018-03-30 PROCEDURE — C1765 ADHESION BARRIER: HCPCS

## 2018-03-30 PROCEDURE — 74177 CT ABD & PELVIS W/CONTRAST: CPT

## 2018-03-30 PROCEDURE — 0W9G3ZX DRAINAGE OF PERITONEAL CAVITY, PERCUTANEOUS APPROACH, DIAGNOSTIC: ICD-10-PCS | Performed by: RADIOLOGY

## 2018-03-30 PROCEDURE — 80048 BASIC METABOLIC PNL TOTAL CA: CPT

## 2018-03-30 PROCEDURE — 96375 TX/PRO/DX INJ NEW DRUG ADDON: CPT

## 2018-03-30 PROCEDURE — 75989 ABSCESS DRAINAGE UNDER X-RAY: CPT

## 2018-03-30 PROCEDURE — 74018 RADEX ABDOMEN 1 VIEW: CPT

## 2018-03-30 PROCEDURE — 96361 HYDRATE IV INFUSION ADD-ON: CPT

## 2018-03-30 PROCEDURE — 93005 ELECTROCARDIOGRAM TRACING: CPT

## 2018-03-30 PROCEDURE — C1729 CATH, DRAINAGE: HCPCS

## 2018-03-30 PROCEDURE — 86850 RBC ANTIBODY SCREEN: CPT

## 2018-03-30 PROCEDURE — C1769 GUIDE WIRE: HCPCS

## 2018-03-30 PROCEDURE — 88307 TISSUE EXAM BY PATHOLOGIST: CPT

## 2018-03-30 PROCEDURE — 85025 COMPLETE CBC W/AUTO DIFF WBC: CPT

## 2018-03-30 PROCEDURE — 96365 THER/PROPH/DIAG IV INF INIT: CPT

## 2018-03-30 PROCEDURE — 86900 BLOOD TYPING SEROLOGIC ABO: CPT

## 2018-03-30 PROCEDURE — 86901 BLOOD TYPING SEROLOGIC RH(D): CPT

## 2018-03-30 PROCEDURE — 81001 URINALYSIS AUTO W/SCOPE: CPT

## 2018-03-30 RX ADMIN — Medication SCH ML: at 21:48

## 2018-03-30 RX ADMIN — DIATRIZOATE MEGLUMINE AND DIATRIZOATE SODIUM ONE ML: 660; 100 LIQUID ORAL; RECTAL at 14:26

## 2018-03-30 RX ADMIN — PHENYTOIN SODIUM SCH MLS/HR: 50 INJECTION INTRAMUSCULAR; INTRAVENOUS at 17:25

## 2018-03-30 RX ADMIN — MORPHINE SULFATE PRN MG: 2 INJECTION, SOLUTION INTRAMUSCULAR; INTRAVENOUS at 21:47

## 2018-03-30 RX ADMIN — DIATRIZOATE MEGLUMINE AND DIATRIZOATE SODIUM ONE ML: 660; 100 LIQUID ORAL; RECTAL at 14:29

## 2018-03-30 RX ADMIN — TAZOBACTAM SODIUM AND PIPERACILLIN SODIUM SCH MLS/HR: 375; 3 INJECTION, SOLUTION INTRAVENOUS at 17:26

## 2018-03-30 RX ADMIN — PHENYTOIN SODIUM SCH MLS/HR: 50 INJECTION INTRAMUSCULAR; INTRAVENOUS at 13:40

## 2018-03-30 RX ADMIN — PHENYTOIN SODIUM SCH MLS/HR: 50 INJECTION INTRAMUSCULAR; INTRAVENOUS at 21:15

## 2018-03-30 RX ADMIN — PHENYTOIN SODIUM SCH MLS/HR: 50 INJECTION INTRAMUSCULAR; INTRAVENOUS at 21:47

## 2018-03-30 NOTE — PD
HPI


Chief Complaint:  Abdominal Pain


Time Seen by Provider:  13:08


Travel History


International Travel<30 days:  No


Contact w/Intl Traveler<30days:  No


Traveled to known affect area:  No





History of Present Illness


HPI


This is a 57-year-old male who presents today with complaints of abdominal 

pain.  The patient was recently admitted for diverticulitis.  The patient at 

that time had some subacute abscesses in his left pelvis.  At that time the 

decision was made not to drain.  He was discharged home on Levaquin and Flagyl.

  He reports that he has had increasing pain, worse with movement.  He also 

reports that bowel movements will relieve the discomfort for short period time 

however the pain then returns.  Is no reported fevers, chills.  Patient states 

he feels "miserable".  I received a call from Dr. Elroy Leslie, surgeon who 

had seen him when he was last hospitalized.  He requested we reevaluate and get 

a CAT scan.





PFSH


Past Medical History


High Cholesterol:  Yes


Diverticulitis:  Yes


GERD:  Yes


Thyroid Disease:  Yes





Family History


Family Hypercholesterolemia:  Yes





Social History


Alcohol Use:  Yes (occ)


Tobacco Use:  No (quit 2017)


Substance Use:  No





Allergies-Medications


(Allergen,Severity, Reaction):  


Coded Allergies:  


     No Known Allergies (Unverified  Allergy, Unknown, 3/30/18)


Reported Meds & Prescriptions





Reported Meds & Active Scripts


Active


Colace (Docusate Sodium) 100 Mg Capsule 100 Mg PO BID PRN


Flagyl (Metronidazole) 500 Mg Tab 500 Mg PO TID


Levaquin (Levofloxacin) 750 Mg Tablet 750 Mg PO DAILY


Reported


Trazodone (Trazodone HCl) 50 Mg Tab 50 Mg PO HS


Prilosec (Omeprazole Magnesium) 20 Mg Tab   


Synthroid (Levothyroxine Sodium) 175 Mcg Tab 175 Mcg PO DAILY








Review of Systems


Except as stated in HPI:  all other systems reviewed are Neg


HENT:  No: Headaches, Lightheadedness


Cardiovascular:  No: Chest Pain or Discomfort, Palpitations


Respiratory:  No: Cough, Shortness of Breath


Gastrointestinal:  Positive: Nausea, Abdominal Pain, No: Vomiting, Diarrhea


Genitourinary:  No: Dysuria, Incontinence


Musculoskeletal:  No: Weakness, Pain


Neurologic:  No: Weakness, Dizziness, Syncope


Psychiatric:  No: Anxiety, Depression





Physical Exam


Narrative


GENERAL: Well-developed well-nourished male in no acute respiratory distress.


SKIN: Focused skin assessment warm/dry.


HEAD: Atraumatic. Normocephalic. 


EYES: No scleral icterus. No injection or drainage. 


ENT: No nasal bleeding or discharge.  Mucous membranes pink and moist.


NECK: Trachea midline.  Supple.


CARDIOVASCULAR: Regular rate and rhythm.  No murmur appreciated.


RESPIRATORY: No accessory muscle use. Clear to auscultation. Breath sounds 

equal bilaterally. 


GASTROINTESTINAL: Abdomen soft, non-tender, nondistended. Hepatic and splenic 

margins not palpable. 


MUSCULOSKELETAL: No obvious deformities. No clubbing.  No cyanosis.  No edema. 


NEUROLOGICAL: Awake and alert. No obvious cranial nerve deficits.  Motor 

grossly within normal limits. Normal speech.





Data


Data


Last Documented VS





Vital Signs








  Date Time  Temp Pulse Resp B/P (MAP) Pulse Ox O2 Delivery O2 Flow Rate FiO2


 


3/30/18 17:26  85 18 144/86 (105) 98 Room Air  


 


3/30/18 13:01 98.4       








Orders





 Orders


Complete Blood Count With Diff (3/30/18 13:08)


Comprehensive Metabolic Panel (3/30/18 13:08)


Urinalysis - C+S If Indicated (3/30/18 13:08)


Ct Abd/Pel W Iv Contrast(Rout) (3/30/18 13:08)


Abdomen, Upright Only (3/30/18 13:08)


Iv Access Insert/Monitor (3/30/18 13:08)


Ecg Monitoring (3/30/18 13:08)


Oximetry (3/30/18 13:08)


Morphine Inj (Morphine Inj) (3/30/18 13:15)


Ondansetron Inj (Zofran Inj) (3/30/18 13:15)


Sodium Chlor 0.9% 1000 Ml Inj (Ns 1000 M (3/30/18 13:15)


Oral Contrast - Adult (3/30/18 13:33)


Diatrizoate Liq (Md Gastroview Liq) (3/30/18 14:26)


Morphine Inj (Morphine Inj) (3/30/18 15:45)


Iohexol 350 Inj (Omnipaque 350 Inj) (3/30/18 15:57)


Morphine Inj (Morphine Inj) (3/30/18 16:30)


Ct Assisted Abscess Drain (3/30/18 )


Admit To Inpatient (3/30/18 )


Vital Signs (Adult) Q4H (3/30/18 16:54)


Activity Oob Ad Francesca (3/30/18 16:54)


Diet Npo (3/30/18 Dinner)


Sodium Chlor 0.9% 1000 Ml Inj (Ns 1000 M (3/30/18 16:54)


Sodium Chloride 0.9% Flush (Ns Flush) (3/30/18 17:00)


Sodium Chloride 0.9% Flush (Ns Flush) (3/30/18 21:00)


Piperacil-Tazo 3.375 Gm Premix (Zosyn 3. (3/30/18 17:00)


Ondansetron Inj (Zofran Inj) (3/30/18 17:00)


Basic Metabolic Panel (Bmp) (3/31/18 06:00)


Complete Blood Count With Diff (3/31/18 06:00)


Scd Bilateral/Knee High IOANA.BID (3/30/18 16:54)


Inpatient Certification (3/30/18 )


Morphine Inj (Morphine Inj) (3/30/18 17:00)





Labs





Laboratory Tests








Test


  3/30/18


13:40


 


White Blood Count 12.4 TH/MM3 


 


Red Blood Count 4.47 MIL/MM3 


 


Hemoglobin 13.7 GM/DL 


 


Hematocrit 40.4 % 


 


Mean Corpuscular Volume 90.4 FL 


 


Mean Corpuscular Hemoglobin 30.6 PG 


 


Mean Corpuscular Hemoglobin


Concent 33.8 % 


 


 


Red Cell Distribution Width 13.3 % 


 


Platelet Count 512 TH/MM3 


 


Mean Platelet Volume 8.2 FL 


 


Neutrophils (%) (Auto) 80.4 % 


 


Lymphocytes (%) (Auto) 11.2 % 


 


Monocytes (%) (Auto) 8.0 % 


 


Eosinophils (%) (Auto) 0.2 % 


 


Basophils (%) (Auto) 0.2 % 


 


Neutrophils # (Auto) 10.0 TH/MM3 


 


Lymphocytes # (Auto) 1.4 TH/MM3 


 


Monocytes # (Auto) 1.0 TH/MM3 


 


Eosinophils # (Auto) 0.0 TH/MM3 


 


Basophils # (Auto) 0.0 TH/MM3 


 


CBC Comment DIFF FINAL 


 


Differential Comment  


 


Blood Urea Nitrogen 15 MG/DL 


 


Creatinine 1.00 MG/DL 


 


Random Glucose 105 MG/DL 


 


Total Protein 7.0 GM/DL 


 


Albumin 2.9 GM/DL 


 


Calcium Level 9.0 MG/DL 


 


Alkaline Phosphatase 92 U/L 


 


Aspartate Amino Transf


(AST/SGOT) 35 U/L 


 


 


Alanine Aminotransferase


(ALT/SGPT) 35 U/L 


 


 


Total Bilirubin 0.5 MG/DL 


 


Sodium Level 136 MEQ/L 


 


Potassium Level 3.9 MEQ/L 


 


Chloride Level 102 MEQ/L 


 


Carbon Dioxide Level 21.6 MEQ/L 


 


Anion Gap 12 MEQ/L 


 


Estimat Glomerular Filtration


Rate 78 ML/MIN 


 











MDM


Medical Decision Making


Medical Screen Exam Complete:  Yes


Emergency Medical Condition:  Yes


Differential Diagnosis


Recurrent diverticulitis versus diverticular abscess versus perforated bowel


Narrative Course


57-year-old male who with previous diverticulitis who had subacute abscesses on 

prior admission, presents here with worsening pain.  Patient's white count is 12

,000.  I have been alerted by Dr. Leslie that he was sending the patient in for 

reevaluation.  CT scan shows 2 large abscesses.  These are definitely a lot 

larger and according to the radiologist, and probably amenable to drainage.  

Case was discussed with Dr. Leslie who has come in to see the patient.  He has 

been started on Zosyn.  Dr. Leslie has spoken with interventional radiologist who 

will drain the abscesses.  The patient will be admitted to Dr. Elroy Leslie.





Diagnosis





 Primary Impression:  


 Intra-abdominal abscesses


 Additional Impressions:  


 Recent diverticulitis


 Intractable pain





Admitting Information


Admitting Physician Requests:  Admit











Jatin Danielle MD Mar 30, 2018 17:43

## 2018-03-30 NOTE — RADRPT
EXAM DATE/TIME:  03/30/2018 14:07 

 

[facility] COMPARISON:     

No previous studies available for comparison.

 

                     

INDICATIONS :     

Free air.

                     

 

MEDICAL HISTORY :     

Diverticulitis.       Patient states he has an abcess due to diverticulitis.Gastroesophageal reflux d
isease. Hypercholesterolemia    

 

SURGICAL HISTORY :     

None.   

 

ENCOUNTER:     

Initial                                        

 

ACUITY:     

3 weeks      

 

PAIN SCORE:     

8/10

 

LOCATION:     

Bilateral  abdomen.

 

FINDINGS:     

A single AP erect view of the upper and mid abdomen were obtained. The pelvis is cut off the exam. Ga
s and stool is noted segmentally in the colon and there are multiple loops of nondilated air-containi
ng small bowel with multiple small air-fluid levels. There is no free air. There is atelectasis in th
e dependent portions of the lung bases.

 

CONCLUSION:     

1. Nonspecific bowel gas pattern with no evidence of free air.

2. Atelectasis in the lung bases.

 

 

 

 Anuj Garcia MD on March 30, 2018 at 15:14           

Board Certified Radiologist.

 This report was verified electronically.

## 2018-03-30 NOTE — RADRPT
EXAM DATE/TIME:  03/30/2018 17:52 

 

[facility] COMPARISON:     

No previous studies available for comparison.

 

 

INDICATIONS :      

Diverticular abscess

                     

 

 

SEDATION TIME:       

30 minutes

                     

 

MEDICATION(S):

1.) 2 mg midazolam (Versed) IV  

2.) 250 mcg fentanyl (Sublimaze) IV  

 

 

DEVICE(S):

1.) 10 Fr Skater 

                     

 

FLUID: 

Total volume of 40 cc of cloudy, yellow fluid was removed.

Fluid was discarded.

                     

 

MEDICAL HISTORY :     

Diverticullitis.   

 

SURGICAL HISTORY :     

None.   

 

ENCOUNTER:     

Initial

 

ACUITY:     

1 day

 

PAIN SCORE:     

6/10

 

LOCATION:      

Abdomen

                     

 

 

PROCEDURE:

1.)   Conscious sedation with continuous EKG and oximetry monitoring.

 

PROCEDURE :     

1.  CT guided drainage of the left pelvic diverticular abscess

2.  Conscious sedation with continuous EKG and oximetry monitoring.

 

The risks, benefits and alternatives to the procedure were explained and verbal and written consent w
as obtained. Using automated exposure control and adjustment of the mA and/or kV according to patient
 size, radiation dose was kept as low as reasonably achievable to obtain optimal diagnostic quality i
mages.   The site was prepped in sterile fashion.  Full sterile technique was used, including cap, ma
sk, sterile gloves and gown and a large sterile sheet.  Hand hygiene and 2% chlorhexidine and/or beta
dine/alcohol prep was utilized per protocol for cutaneous antisepsis.  The skin and subcutaneous tiss
ues were infiltrated with local anesthetic solution.  DICOM format image data is available electronic
ally for review and comparison.  

 

Using CT guidance the prescribed site was localized.  Drainage was performed using the prescribed cat
heter

 

The patient tolerated the procedure well and there were no complications.  Conscious sedation was per
formed with the prescribed dosages and duration as above in the presence of an independent trained ra
diology nurse to assist in the monitoring of the patient.  EKG and oximetry remained stable throughou
t the procedure.

The patient tolerated the procedure well and there were no complications. The patient was sent to pos
t anesthesia recovery in stable condition.

 

CONCLUSION:     Uncomplicated CT guided drainage.

 

 

 

 Nile Whatley MD on March 30, 2018 at 21:01           

Board Certified Radiologist.

 This report was verified electronically.

## 2018-03-30 NOTE — RADRPT
EXAM DATE/TIME:  03/30/2018 15:49 

 

[facility] COMPARISON:     

CT ABDOMEN & PELVIS W CONTRAST, March 11, 2018, 11:28.

 

 

INDICATIONS :     

Left lower quadrant pain. 

                      

 

IV CONTRAST:     

95 cc Omnipaque 350 (iohexol) IV 

 

 

ORAL CONTRAST:      

Partial prescribed oral contrast ingested.

                      

 

RADIATION DOSE:     

8.58 CTDIvol (mGy) 

 

 

MEDICAL HISTORY :     

Diverticulitis.  

 

SURGICAL HISTORY :      

None. 

 

ENCOUNTER:      

Initial

 

ACUITY:      

1 week

 

PAIN SCALE:      

6/10

 

LOCATION:       

Left lower quadrant 

 

TECHNIQUE:     

Volumetric scanning of the abdomen and pelvis was performed.  Using automated exposure control and ad
justment of the mA and/or kV according to patient size, radiation dose was kept as low as reasonably 
achievable to obtain optimal diagnostic quality images.  DICOM format image data is available electro
nically for review and comparison.  

 

FINDINGS:     

 

LOWER LUNGS:     

The visualized lower lungs are clear.

 

LIVER:     

Homogeneous density without lesion.  There is no dilation of the biliary tree.  No calcified gallston
es.

 

SPLEEN:     

Normal size without lesion.

 

PANCREAS:     

Within normal limits.

 

KIDNEYS:     

Normal in size and shape.  There is no mass, stone or hydronephrosis.

 

ADRENAL GLANDS:     

Within normal limits.

 

VASCULAR:     

There is no aortic aneurysm.

 

BOWEL/MESENTERY:     

Significant wall thickening with pericolonic inflammation remains evident in the left colon extending
 from the distal descending segment into the mid to distal sigmoid segment. He previously identified 
small multiloculated extra intestinal fluid collection along the proximal sigmoid colon has significa
ntly enlarged and matured. A multiloculated fluid collection with containing air is now noted and hanane
sures 5.5 x 8.3 x 7.2 cm in size.

     Additionally a second extra intestinal collection containing mainly air is identified anterior t
o the mid descending colon distal to the splenic flexure. This abuts the anterolateral wall of the ab
domen and may have originated from the lower collection.

 

ABDOMINAL WALL:     

Within normal limits.

 

RETROPERITONEUM:     

There is no lymphadenopathy. 

 

BLADDER:     

No wall thickening or mass. 

 

REPRODUCTIVE:     

Within normal limits.

 

INGUINAL:     

There is no lymphadenopathy or hernia. 

 

MUSCULOSKELETAL:     

Within normal limits for patient age. 

 

CONCLUSION:     

1. Sigmoid diverticulitis

2. Moderate sized perisigmoid diverticular abscess with significant enlargement compared to the prior
 study.

3. Second extra intestinal air filled collection along the mid descending colon representing either a
ir tracking from the lower collection or new perforation.

4. No significant ileus, free air or other fluid collections.

5. Otherwise stable evaluation.

 

 

 

 Thomas Radford MD on March 30, 2018 at 16:03           

Board Certified Radiologist.

 This report was verified electronically.

## 2018-03-30 NOTE — HHI.HP
__________________________________________________





HPI


Service


General Surgery


Primary Care Physician


Víctor Martin, DO


Admission Diagnosis


diverticulitis with abscess


Chief Complaint:  


abdominal pain


History of Present Illness


56 yo M well known to me for him recent hospitalization for diverticulitis with 

abscess.  He has been following with me as an outpatient and has been on oral 

Levaquin and Flagyl.  He came by the office today and spoke with my staff 

complaining of severe pain persisted in the left lower abdomen.  When I spoke 

with him by phone he sounded short of breath and again complained of severe 

pain.  I recommended presenting to the emergency department.  He is been 

evaluated here and found to have leukocytosis and a large abscess associated 

with diverticulitis.  He reports about a 10 year history of episodes of 

diverticulitis.





Review of Systems


Constitutional:  COMPLAINS OF: Chills, DENIES: Fever


Eyes:  DENIES: Eye inflammation, Eye pain


Ears, nose, mouth, throat:  DENIES: Nasal discharge, Oral lesions


Respiratory:  COMPLAINS OF: Shortness of breath, DENIES: Cough, Wheezing


Cardiovascular:  DENIES: Chest pain, Palpitations


Gastrointestinal:  COMPLAINS OF: Abdominal pain


Integumentary:  DENIES: Pruritus, Rash


Neurologic:  DENIES: Paresthesias, Seizures





Past Family Social History


Past Medical History


Diverticulitis


GERD


Past Surgical History


No previous abdominal surgeries


Reported Medications





Reported Meds & Active Scripts


Active


Colace (Docusate Sodium) 100 Mg Capsule 100 Mg PO BID PRN


Flagyl (Metronidazole) 500 Mg Tab 500 Mg PO TID


Levaquin (Levofloxacin) 750 Mg Tablet 750 Mg PO DAILY


Reported


Trazodone (Trazodone HCl) 50 Mg Tab 50 Mg PO HS


Prilosec (Omeprazole Magnesium) 20 Mg Tab   


Synthroid (Levothyroxine Sodium) 175 Mcg Tab 175 Mcg PO DAILY


Allergies:  


Coded Allergies:  


     No Known Allergies (Unverified  Allergy, Unknown, 3/30/18)


Active Ordered Medications





Current Medications








 Medications


  (Trade)  Dose


 Ordered  Sig/Zacarias


 Route  Start Time


 Stop Time Status Last Admin


 


 Sodium Chloride  1,000 ml @ 


 125 mls/hr  Q8H


 IV  3/30/18 13:15


    3/30/18 13:40


 








Family History


Noncontributory


Social History


Drinks alcohol socially.  No tobacco or drug use.





Physical Exam


Vital Signs





Vital Signs








  Date Time  Temp Pulse Resp B/P (MAP) Pulse Ox O2 Delivery O2 Flow Rate FiO2


 


3/30/18 14:41  80 16 122/86 (98) 95 Room Air  


 


3/30/18 13:45   16     


 


3/30/18 13:41   22  98 Room Air  


 


3/30/18 13:16  85 16 122/86 (98) 96 Room Air  


 


3/30/18 13:01 98.4 89 19 125/75 (92) 100   








Physical Exam


GENERAL: Awake and alert.  No acute distress.  Cooperative.


HEAD: Normocephalic.  Atraumatic.


EYES:  Pupils equal round and reactive to light bilaterally.  No scleral 

icterus.


ENT: Moist oral mucosa.


NECK: Trachea midline.


CHEST: Lungs clear to auscultation bilaterally with no wheezing or rhonchi.  No 

respiratory distress.


CARDIOVASCULAR:  Regular rate and rhythm.


ABDOMEN: Moderate distention.  Umbilical hernia incarcerated with fat.  Mild 

tenderness in the right lower abdomen.  Severe tenderness left lower abdomen.


EXTREMITIES: No cyanosis or edema.


SKIN: Warm, dry, nonjaundiced.


Laboratory





Laboratory Tests








Test


  3/30/18


13:40


 


White Blood Count 12.4 


 


Red Blood Count 4.47 


 


Hemoglobin 13.7 


 


Hematocrit 40.4 


 


Mean Corpuscular Volume 90.4 


 


Mean Corpuscular Hemoglobin 30.6 


 


Mean Corpuscular Hemoglobin


Concent 33.8 


 


 


Red Cell Distribution Width 13.3 


 


Platelet Count 512 


 


Mean Platelet Volume 8.2 


 


Neutrophils (%) (Auto) 80.4 


 


Lymphocytes (%) (Auto) 11.2 


 


Monocytes (%) (Auto) 8.0 


 


Eosinophils (%) (Auto) 0.2 


 


Basophils (%) (Auto) 0.2 


 


Neutrophils # (Auto) 10.0 


 


Lymphocytes # (Auto) 1.4 


 


Monocytes # (Auto) 1.0 


 


Eosinophils # (Auto) 0.0 


 


Basophils # (Auto) 0.0 


 


CBC Comment DIFF FINAL 


 


Differential Comment  


 


Blood Urea Nitrogen 15 


 


Creatinine 1.00 


 


Random Glucose 105 


 


Total Protein 7.0 


 


Albumin 2.9 


 


Calcium Level 9.0 


 


Alkaline Phosphatase 92 


 


Aspartate Amino Transf


(AST/SGOT) 35 


 


 


Alanine Aminotransferase


(ALT/SGPT) 35 


 


 


Total Bilirubin 0.5 


 


Sodium Level 136 


 


Potassium Level 3.9 


 


Chloride Level 102 


 


Carbon Dioxide Level 21.6 


 


Anion Gap 12 


 


Estimat Glomerular Filtration


Rate 78 


 








Result Diagram:  


3/30/18 1340                                                                   

             3/30/18 1340





Imaging





Last Impressions








Abdomen/Pelvis CT 3/30/18 1308 Signed





Impressions: 





 Service Date/Time:  2018 15:49 - CONCLUSION:  1. Sigmoid 





 diverticulitis 2. Moderate sized perisigmoid diverticular abscess with 





 significant enlargement compared to the prior study. 3. Second extra 

intestinal 





 air filled collection along the mid descending colon representing either air 





 tracking from the lower collection or new perforation. 4. No significant ileus

, 





 free air or other fluid collections. 5. Otherwise stable evaluation.     Thomas Radford MD 


 


Abdomen X-Ray 3/30/18 1308 Signed





Impressions: 





 Service Date/Time:  2018 14:07 - CONCLUSION:  1. Nonspecific 





 bowel gas pattern with no evidence of free air. 2. Atelectasis in the lung 





 bases.     Michael R. Schiering, MD Caprini VTE Risk Assessment


Caprini VTE Risk Assessment:  No/Low Risk (score <= 1)


Caprini Risk Assessment Model











 Point Value = 1          Point Value = 2  Point Value = 3  Point Value = 5


 


Age 41-60


Minor surgery


BMI > 25 kg/m2


Swollen legs


Varicose veins


Pregnancy or postpartum


History of unexplained or recurrent


   spontaneous 


Oral contraceptives or hormone


   replacement


Sepsis (< 1 month)


Serious lung disease, including


   pneumonia (< 1 month)


Abnormal pulmonary function


Acute myocardial infarction


Congestive heart failure (< 1 month)


History of inflammatory bowel disease


Medical patient at bed rest Age 61-74


Arthroscopic surgery


Major open surgery (> 45 min)


Laparoscopic surgery (> 45 min)


Malignancy


Confined to bed (> 72 hours)


Immobilizing plaster cast


Central venous access Age >= 75


History of VTE


Family history of VTE


Factor V Leiden


Prothrombin 03431K


Lupus anticoagulant


Anticardiolipin antibodies


Elevated serum homocysteine


Heparin-induced thrombocytopenia


Other congenital or acquired


   thrombophilia Stroke (< 1 month)


Elective arthroplasty


Hip, pelvis, or leg fracture


Acute spinal cord injury (< 1 month)








Prophylaxis Regimen











   Total Risk


Factor Score Risk Level Prophylaxis Regimen


 


0-1      Low Early ambulation


 


2 Moderate Order ONE of the following:


*Sequential Compression Device (SCD)


*Heparin 5000 units SQ BID


 


3-4 Higher Order ONE of the following medications:


*Heparin 5000 units SQ TID


*Enoxaparin/Lovenox 40 mg SQ daily (WT < 150 kg, CrCl > 30 mL/min)


*Enoxaparin/Lovenox 30 mg SQ daily (WT < 150 kg, CrCl > 10-29 mL/min)


*Enoxaparin/Lovenox 30 mg SQ BID (WT < 150 kg, CrCl > 30 mL/min)


AND/OR


*Sequential Compression Device (SCD)


 


5 or more Highest Order ONE of the following medications:


*Heparin 5000 units SQ TID (Preferred with Epidurals)


*Enoxaparin/Lovenox 40 mg SQ daily (WT < 150 kg, CrCl > 30 mL/min)


*Enoxaparin/Lovenox 30 mg SQ daily (WT < 150 kg, CrCl > 10-29 mL/min)


*Enoxaparin/Lovenox 30 mg SQ BID (WT < 150 kg, CrCl > 30 mL/min)


AND


*Sequential Compression Device (SCD)











Assessment and Plan


Assessment and Plan


57-year-old male with diverticulitis with associated abscess.  He was failing 

outpatient treatment and on CT abdomen and pelvis today is found to have a 

large abscess.  Will give IV antibiotics.  We will request CT-guided abscess 

drainage.  If he does not improve significantly in the next 2-3 days we will 

plan for laparoscopic-assisted diverting ileostomy.  He is not a candidate for 

a primary resection and anastomosis at this time and I would like to avoid a 

Johnson's procedure possible.  This is all discussed in detail with him and he 

understands and agrees with plan.











Elroy Leslie MD Mar 30, 2018 17:02

## 2018-03-31 VITALS
OXYGEN SATURATION: 93 % | HEART RATE: 77 BPM | RESPIRATION RATE: 18 BRPM | TEMPERATURE: 98.7 F | DIASTOLIC BLOOD PRESSURE: 72 MMHG | SYSTOLIC BLOOD PRESSURE: 123 MMHG

## 2018-03-31 VITALS
DIASTOLIC BLOOD PRESSURE: 74 MMHG | HEART RATE: 77 BPM | OXYGEN SATURATION: 96 % | SYSTOLIC BLOOD PRESSURE: 130 MMHG | RESPIRATION RATE: 20 BRPM | TEMPERATURE: 99.5 F

## 2018-03-31 VITALS
SYSTOLIC BLOOD PRESSURE: 126 MMHG | OXYGEN SATURATION: 94 % | TEMPERATURE: 100 F | DIASTOLIC BLOOD PRESSURE: 71 MMHG | RESPIRATION RATE: 20 BRPM | HEART RATE: 81 BPM

## 2018-03-31 VITALS
OXYGEN SATURATION: 96 % | TEMPERATURE: 98.2 F | SYSTOLIC BLOOD PRESSURE: 123 MMHG | HEART RATE: 69 BPM | RESPIRATION RATE: 18 BRPM | DIASTOLIC BLOOD PRESSURE: 79 MMHG

## 2018-03-31 VITALS
RESPIRATION RATE: 18 BRPM | DIASTOLIC BLOOD PRESSURE: 78 MMHG | TEMPERATURE: 99.5 F | HEART RATE: 79 BPM | OXYGEN SATURATION: 96 % | SYSTOLIC BLOOD PRESSURE: 137 MMHG

## 2018-03-31 LAB
BASOPHILS # BLD AUTO: 0 TH/MM3 (ref 0–0.2)
BASOPHILS NFR BLD: 0.2 % (ref 0–2)
BUN SERPL-MCNC: 12 MG/DL (ref 7–18)
CALCIUM SERPL-MCNC: 8.4 MG/DL (ref 8.5–10.1)
CHLORIDE SERPL-SCNC: 105 MEQ/L (ref 98–107)
CREAT SERPL-MCNC: 0.82 MG/DL (ref 0.6–1.3)
EOSINOPHIL # BLD: 0 TH/MM3 (ref 0–0.4)
EOSINOPHIL NFR BLD: 0.2 % (ref 0–4)
ERYTHROCYTE [DISTWIDTH] IN BLOOD BY AUTOMATED COUNT: 13.1 % (ref 11.6–17.2)
GFR SERPLBLD BASED ON 1.73 SQ M-ARVRAT: 98 ML/MIN (ref 89–?)
GLUCOSE SERPL-MCNC: 111 MG/DL (ref 74–106)
HCO3 BLD-SCNC: 25.6 MEQ/L (ref 21–32)
HCT VFR BLD CALC: 36.2 % (ref 39–51)
HGB BLD-MCNC: 12.2 GM/DL (ref 13–17)
LYMPHOCYTES # BLD AUTO: 0.7 TH/MM3 (ref 1–4.8)
LYMPHOCYTES NFR BLD AUTO: 7.4 % (ref 9–44)
MCH RBC QN AUTO: 30.8 PG (ref 27–34)
MCHC RBC AUTO-ENTMCNC: 33.7 % (ref 32–36)
MCV RBC AUTO: 91.4 FL (ref 80–100)
MONOCYTE #: 0.8 TH/MM3 (ref 0–0.9)
MONOCYTES NFR BLD: 8.7 % (ref 0–8)
NEUTROPHILS # BLD AUTO: 7.6 TH/MM3 (ref 1.8–7.7)
NEUTROPHILS NFR BLD AUTO: 83.5 % (ref 16–70)
PLATELET # BLD: 361 TH/MM3 (ref 150–450)
PMV BLD AUTO: 8.2 FL (ref 7–11)
RBC # BLD AUTO: 3.96 MIL/MM3 (ref 4.5–5.9)
SODIUM SERPL-SCNC: 139 MEQ/L (ref 136–145)
WBC # BLD AUTO: 9.1 TH/MM3 (ref 4–11)

## 2018-03-31 RX ADMIN — ONDANSETRON PRN MG: 2 INJECTION, SOLUTION INTRAMUSCULAR; INTRAVENOUS at 10:12

## 2018-03-31 RX ADMIN — TAZOBACTAM SODIUM AND PIPERACILLIN SODIUM SCH MLS/HR: 375; 3 INJECTION, SOLUTION INTRAVENOUS at 11:02

## 2018-03-31 RX ADMIN — ONDANSETRON PRN MG: 2 INJECTION, SOLUTION INTRAMUSCULAR; INTRAVENOUS at 17:12

## 2018-03-31 RX ADMIN — TAZOBACTAM SODIUM AND PIPERACILLIN SODIUM SCH MLS/HR: 375; 3 INJECTION, SOLUTION INTRAVENOUS at 00:57

## 2018-03-31 RX ADMIN — MORPHINE SULFATE PRN MG: 2 INJECTION, SOLUTION INTRAMUSCULAR; INTRAVENOUS at 13:57

## 2018-03-31 RX ADMIN — PHENYTOIN SODIUM SCH MLS/HR: 50 INJECTION INTRAMUSCULAR; INTRAVENOUS at 16:54

## 2018-03-31 RX ADMIN — MORPHINE SULFATE PRN MG: 2 INJECTION, SOLUTION INTRAMUSCULAR; INTRAVENOUS at 00:57

## 2018-03-31 RX ADMIN — MORPHINE SULFATE PRN MG: 2 INJECTION, SOLUTION INTRAMUSCULAR; INTRAVENOUS at 04:53

## 2018-03-31 RX ADMIN — PHENYTOIN SODIUM SCH MLS/HR: 50 INJECTION INTRAMUSCULAR; INTRAVENOUS at 04:53

## 2018-03-31 RX ADMIN — PHENYTOIN SODIUM SCH MLS/HR: 50 INJECTION INTRAMUSCULAR; INTRAVENOUS at 07:49

## 2018-03-31 RX ADMIN — TAZOBACTAM SODIUM AND PIPERACILLIN SODIUM SCH MLS/HR: 375; 3 INJECTION, SOLUTION INTRAVENOUS at 23:01

## 2018-03-31 RX ADMIN — MORPHINE SULFATE PRN MG: 2 INJECTION, SOLUTION INTRAMUSCULAR; INTRAVENOUS at 07:47

## 2018-03-31 RX ADMIN — MORPHINE SULFATE PRN MG: 2 INJECTION, SOLUTION INTRAMUSCULAR; INTRAVENOUS at 17:12

## 2018-03-31 RX ADMIN — TAZOBACTAM SODIUM AND PIPERACILLIN SODIUM SCH MLS/HR: 375; 3 INJECTION, SOLUTION INTRAVENOUS at 17:26

## 2018-03-31 RX ADMIN — PHENYTOIN SODIUM SCH MLS/HR: 50 INJECTION INTRAMUSCULAR; INTRAVENOUS at 13:15

## 2018-03-31 RX ADMIN — Medication SCH ML: at 09:00

## 2018-03-31 RX ADMIN — MORPHINE SULFATE PRN MG: 2 INJECTION, SOLUTION INTRAMUSCULAR; INTRAVENOUS at 20:16

## 2018-03-31 RX ADMIN — MORPHINE SULFATE PRN MG: 2 INJECTION, SOLUTION INTRAMUSCULAR; INTRAVENOUS at 11:00

## 2018-03-31 RX ADMIN — MORPHINE SULFATE PRN MG: 2 INJECTION, SOLUTION INTRAMUSCULAR; INTRAVENOUS at 23:03

## 2018-03-31 RX ADMIN — TAZOBACTAM SODIUM AND PIPERACILLIN SODIUM SCH MLS/HR: 375; 3 INJECTION, SOLUTION INTRAVENOUS at 04:52

## 2018-03-31 RX ADMIN — Medication SCH ML: at 23:01

## 2018-03-31 NOTE — HHI.PR
__________________________________________________





Subjective


Subjective Notes


Still with LLQ pain, more acute, possibly from drain. Overall looks much better.





Objective


Vitals/I&O





Vital Signs








  Date Time  Temp Pulse Resp B/P (MAP) Pulse Ox O2 Delivery O2 Flow Rate FiO2


 


3/31/18 00:00 99.5 77 20 130/74 (92) 96   


 


3/30/18 18:53      Room Air  








Labs





Laboratory Tests








Test


  3/30/18


13:40 3/30/18


17:30 3/31/18


06:06


 


White Blood Count 12.4   9.1 


 


Red Blood Count 4.47   3.96 


 


Hemoglobin 13.7   12.2 


 


Hematocrit 40.4   36.2 


 


Mean Corpuscular Volume 90.4   91.4 


 


Mean Corpuscular Hemoglobin 30.6   30.8 


 


Mean Corpuscular Hemoglobin


Concent 33.8 


  


  33.7 


 


 


Red Cell Distribution Width 13.3   13.1 


 


Platelet Count 512   361 


 


Mean Platelet Volume 8.2   8.2 


 


Neutrophils (%) (Auto) 80.4   83.5 


 


Lymphocytes (%) (Auto) 11.2   7.4 


 


Monocytes (%) (Auto) 8.0   8.7 


 


Eosinophils (%) (Auto) 0.2   0.2 


 


Basophils (%) (Auto) 0.2   0.2 


 


Neutrophils # (Auto) 10.0   7.6 


 


Lymphocytes # (Auto) 1.4   0.7 


 


Monocytes # (Auto) 1.0   0.8 


 


Eosinophils # (Auto) 0.0   0.0 


 


Basophils # (Auto) 0.0   0.0 


 


CBC Comment DIFF FINAL   DIFF FINAL 


 


Differential Comment     


 


Blood Urea Nitrogen 15   12 


 


Creatinine 1.00   0.82 


 


Random Glucose 105   111 


 


Total Protein 7.0   


 


Albumin 2.9   


 


Calcium Level 9.0   8.4 


 


Alkaline Phosphatase 92   


 


Aspartate Amino Transf


(AST/SGOT) 35 


  


  


 


 


Alanine Aminotransferase


(ALT/SGPT) 35 


  


  


 


 


Total Bilirubin 0.5   


 


Sodium Level 136   139 


 


Potassium Level 3.9   3.8 


 


Chloride Level 102   105 


 


Carbon Dioxide Level 21.6   25.6 


 


Anion Gap 12   8 


 


Estimat Glomerular Filtration


Rate 78 


  


  98 


 


 


Urine Color  YELLOW  


 


Urine Turbidity  CLEAR  


 


Urine pH  7.0  


 


Urine Specific Gravity


  


  GREATER THAN


1.050 


 


 


Urine Protein  30  


 


Urine Glucose (UA)  NEG  


 


Urine Ketones  10  


 


Urine Occult Blood  NEG  


 


Urine Nitrite  NEG  


 


Urine Bilirubin  NEG  


 


Urine Urobilinogen  LESS THAN 2.0  


 


Urine Leukocyte Esterase  NEG  


 


Urine WBC  5  


 


Microscopic Urinalysis Comment


  


  CULT NOT


INDICATED 


 








Radiology





Last Impressions








Abdomen/Pelvis CT 3/30/18 1308 Signed





Impressions: 





 Service Date/Time:  Friday, March 30, 2018 15:49 - CONCLUSION:  1. Sigmoid 





 diverticulitis 2. Moderate sized perisigmoid diverticular abscess with 





 significant enlargement compared to the prior study. 3. Second extra 

intestinal 





 air filled collection along the mid descending colon representing either air 





 tracking from the lower collection or new perforation. 4. No significant ileus

, 





 free air or other fluid collections. 5. Otherwise stable evaluation.     Thomas Radford MD 


 


Abdomen X-Ray 3/30/18 1308 Signed





Impressions: 





 Service Date/Time:  Friday, March 30, 2018 14:07 - CONCLUSION:  1. Nonspecific 





 bowel gas pattern with no evidence of free air. 2. Atelectasis in the lung 





 bases.     Anuj Garcia MD 








Narrative Exam


NAD


Abd: soft, tender in LLQ, accordion with 40cc pus drained yesterday and 70cc 

overnight.





A/P


Assessment and Plan


58 yo M with large peridiverticular abscess, s/p CT guided drainage 3/31.  





Cont drain and IV antibiotics.  If not significantly improved by early this 

week will need surgical intervention, likely diverting ileostomy.  NPO.











Elroy Leslie MD Mar 31, 2018 09:02

## 2018-04-01 VITALS
TEMPERATURE: 100.9 F | SYSTOLIC BLOOD PRESSURE: 112 MMHG | OXYGEN SATURATION: 96 % | DIASTOLIC BLOOD PRESSURE: 73 MMHG | HEART RATE: 79 BPM | RESPIRATION RATE: 20 BRPM

## 2018-04-01 VITALS
RESPIRATION RATE: 17 BRPM | TEMPERATURE: 99.1 F | SYSTOLIC BLOOD PRESSURE: 111 MMHG | OXYGEN SATURATION: 93 % | DIASTOLIC BLOOD PRESSURE: 68 MMHG | HEART RATE: 71 BPM

## 2018-04-01 VITALS
RESPIRATION RATE: 16 BRPM | TEMPERATURE: 97.7 F | HEART RATE: 73 BPM | OXYGEN SATURATION: 94 % | SYSTOLIC BLOOD PRESSURE: 117 MMHG | DIASTOLIC BLOOD PRESSURE: 75 MMHG

## 2018-04-01 VITALS
DIASTOLIC BLOOD PRESSURE: 70 MMHG | RESPIRATION RATE: 18 BRPM | TEMPERATURE: 97.5 F | SYSTOLIC BLOOD PRESSURE: 115 MMHG | OXYGEN SATURATION: 97 % | HEART RATE: 77 BPM

## 2018-04-01 VITALS
TEMPERATURE: 97.8 F | DIASTOLIC BLOOD PRESSURE: 77 MMHG | RESPIRATION RATE: 20 BRPM | SYSTOLIC BLOOD PRESSURE: 128 MMHG | HEART RATE: 80 BPM | OXYGEN SATURATION: 96 %

## 2018-04-01 RX ADMIN — TAZOBACTAM SODIUM AND PIPERACILLIN SODIUM SCH MLS/HR: 375; 3 INJECTION, SOLUTION INTRAVENOUS at 18:21

## 2018-04-01 RX ADMIN — MORPHINE SULFATE PRN MG: 2 INJECTION, SOLUTION INTRAMUSCULAR; INTRAVENOUS at 12:02

## 2018-04-01 RX ADMIN — TAZOBACTAM SODIUM AND PIPERACILLIN SODIUM SCH MLS/HR: 375; 3 INJECTION, SOLUTION INTRAVENOUS at 12:03

## 2018-04-01 RX ADMIN — MORPHINE SULFATE PRN MG: 2 INJECTION, SOLUTION INTRAMUSCULAR; INTRAVENOUS at 18:21

## 2018-04-01 RX ADMIN — MORPHINE SULFATE PRN MG: 2 INJECTION, SOLUTION INTRAMUSCULAR; INTRAVENOUS at 02:33

## 2018-04-01 RX ADMIN — MORPHINE SULFATE PRN MG: 2 INJECTION, SOLUTION INTRAMUSCULAR; INTRAVENOUS at 21:25

## 2018-04-01 RX ADMIN — Medication SCH ML: at 08:41

## 2018-04-01 RX ADMIN — MORPHINE SULFATE PRN MG: 2 INJECTION, SOLUTION INTRAMUSCULAR; INTRAVENOUS at 08:39

## 2018-04-01 RX ADMIN — ONDANSETRON PRN MG: 2 INJECTION, SOLUTION INTRAMUSCULAR; INTRAVENOUS at 02:35

## 2018-04-01 RX ADMIN — ONDANSETRON PRN MG: 2 INJECTION, SOLUTION INTRAMUSCULAR; INTRAVENOUS at 08:39

## 2018-04-01 RX ADMIN — TAZOBACTAM SODIUM AND PIPERACILLIN SODIUM SCH MLS/HR: 375; 3 INJECTION, SOLUTION INTRAVENOUS at 05:31

## 2018-04-01 RX ADMIN — MORPHINE SULFATE PRN MG: 2 INJECTION, SOLUTION INTRAMUSCULAR; INTRAVENOUS at 05:38

## 2018-04-01 RX ADMIN — TAZOBACTAM SODIUM AND PIPERACILLIN SODIUM SCH MLS/HR: 375; 3 INJECTION, SOLUTION INTRAVENOUS at 22:38

## 2018-04-01 RX ADMIN — Medication SCH ML: at 21:00

## 2018-04-01 RX ADMIN — PHENYTOIN SODIUM SCH MLS/HR: 50 INJECTION INTRAMUSCULAR; INTRAVENOUS at 02:34

## 2018-04-01 RX ADMIN — POTASSIUM CHLORIDE, DEXTROSE MONOHYDRATE AND SODIUM CHLORIDE SCH MLS/HR: 150; 5; 450 INJECTION, SOLUTION INTRAVENOUS at 15:11

## 2018-04-01 RX ADMIN — MORPHINE SULFATE PRN MG: 2 INJECTION, SOLUTION INTRAMUSCULAR; INTRAVENOUS at 15:11

## 2018-04-01 RX ADMIN — PHENYTOIN SODIUM SCH MLS/HR: 50 INJECTION INTRAMUSCULAR; INTRAVENOUS at 08:44

## 2018-04-01 NOTE — HHI.PR
__________________________________________________





Subjective


Subjective Notes


Tm 100.9.  Still with LLQ pain.  WBC normal.





Objective


Vitals/I&O





Vital Signs








  Date Time  Temp Pulse Resp B/P (MAP) Pulse Ox O2 Delivery O2 Flow Rate FiO2


 


4/1/18 08:00 99.1 71 17 111/68 (82) 93   


 


3/30/18 18:53      Room Air  








Radiology





Last Impressions








Abdomen/Pelvis CT 3/30/18 1308 Signed





Impressions: 





 Service Date/Time:  Friday, March 30, 2018 15:49 - CONCLUSION:  1. Sigmoid 





 diverticulitis 2. Moderate sized perisigmoid diverticular abscess with 





 significant enlargement compared to the prior study. 3. Second extra 

intestinal 





 air filled collection along the mid descending colon representing either air 





 tracking from the lower collection or new perforation. 4. No significant ileus

, 





 free air or other fluid collections. 5. Otherwise stable evaluation.     Thomas Radford MD 


 


Abdomen X-Ray 3/30/18 1308 Signed





Impressions: 





 Service Date/Time:  Friday, March 30, 2018 14:07 - CONCLUSION:  1. Nonspecific 





 bowel gas pattern with no evidence of free air. 2. Atelectasis in the lung 





 bases.     Anuj Garcia MD 








Narrative Exam


NAD


Abd: soft, tender in LLQ, accordion with 160cc/24h pus and brown fluid ? stool





A/P


Assessment and Plan


56 yo M with large peridiverticular abscess, s/p CT guided drainage 3/31.  





He is not significantly improving. I recommend lap assisted ileostomy and 

abscess drainage/washout.  I have discussed multiple times the details and 

rationale of this procedure, and necessity of sigmoid resection and ileostomy 

reversal as separate procedures in the future. He desires to proceed with the 

operation.  Likely will be done tomorrow afternoon.











AnujElroy black MD Apr 1, 2018 12:47

## 2018-04-02 VITALS
DIASTOLIC BLOOD PRESSURE: 85 MMHG | TEMPERATURE: 98.2 F | HEART RATE: 64 BPM | RESPIRATION RATE: 20 BRPM | SYSTOLIC BLOOD PRESSURE: 140 MMHG | OXYGEN SATURATION: 96 %

## 2018-04-02 VITALS
RESPIRATION RATE: 20 BRPM | DIASTOLIC BLOOD PRESSURE: 71 MMHG | TEMPERATURE: 99.3 F | OXYGEN SATURATION: 96 % | HEART RATE: 101 BPM | SYSTOLIC BLOOD PRESSURE: 124 MMHG

## 2018-04-02 VITALS
RESPIRATION RATE: 16 BRPM | HEART RATE: 65 BPM | SYSTOLIC BLOOD PRESSURE: 101 MMHG | OXYGEN SATURATION: 98 % | DIASTOLIC BLOOD PRESSURE: 66 MMHG

## 2018-04-02 VITALS
OXYGEN SATURATION: 96 % | TEMPERATURE: 97.8 F | HEART RATE: 68 BPM | RESPIRATION RATE: 17 BRPM | DIASTOLIC BLOOD PRESSURE: 55 MMHG | SYSTOLIC BLOOD PRESSURE: 95 MMHG

## 2018-04-02 VITALS
RESPIRATION RATE: 20 BRPM | HEART RATE: 65 BPM | OXYGEN SATURATION: 95 % | DIASTOLIC BLOOD PRESSURE: 74 MMHG | TEMPERATURE: 98.6 F | SYSTOLIC BLOOD PRESSURE: 121 MMHG

## 2018-04-02 PROCEDURE — 3E0M05Z INTRODUCTION OF ADHESION BARRIER INTO PERITONEAL CAVITY, OPEN APPROACH: ICD-10-PCS | Performed by: SURGERY

## 2018-04-02 PROCEDURE — 0DNN4ZZ RELEASE SIGMOID COLON, PERCUTANEOUS ENDOSCOPIC APPROACH: ICD-10-PCS | Performed by: SURGERY

## 2018-04-02 PROCEDURE — 0DN84ZZ RELEASE SMALL INTESTINE, PERCUTANEOUS ENDOSCOPIC APPROACH: ICD-10-PCS | Performed by: SURGERY

## 2018-04-02 PROCEDURE — 0T9B70Z DRAINAGE OF BLADDER WITH DRAINAGE DEVICE, VIA NATURAL OR ARTIFICIAL OPENING: ICD-10-PCS | Performed by: SURGERY

## 2018-04-02 PROCEDURE — 0W9G0ZX DRAINAGE OF PERITONEAL CAVITY, OPEN APPROACH, DIAGNOSTIC: ICD-10-PCS | Performed by: SURGERY

## 2018-04-02 PROCEDURE — 0DBM0ZZ EXCISION OF DESCENDING COLON, OPEN APPROACH: ICD-10-PCS | Performed by: SURGERY

## 2018-04-02 PROCEDURE — 0D1M0Z4 BYPASS DESCENDING COLON TO CUTANEOUS, OPEN APPROACH: ICD-10-PCS | Performed by: SURGERY

## 2018-04-02 PROCEDURE — 0DBN0ZZ EXCISION OF SIGMOID COLON, OPEN APPROACH: ICD-10-PCS | Performed by: SURGERY

## 2018-04-02 RX ADMIN — TAZOBACTAM SODIUM AND PIPERACILLIN SODIUM SCH MLS/HR: 375; 3 INJECTION, SOLUTION INTRAVENOUS at 11:00

## 2018-04-02 RX ADMIN — CLONIDINE HYDROCHLORIDE SCH MG: 0.1 INJECTION, SOLUTION EPIDURAL at 17:00

## 2018-04-02 RX ADMIN — Medication SCH ML: at 21:00

## 2018-04-02 RX ADMIN — Medication SCH ML: at 08:29

## 2018-04-02 RX ADMIN — POTASSIUM CHLORIDE, DEXTROSE MONOHYDRATE AND SODIUM CHLORIDE SCH MLS/HR: 150; 5; 450 INJECTION, SOLUTION INTRAVENOUS at 00:22

## 2018-04-02 RX ADMIN — TAZOBACTAM SODIUM AND PIPERACILLIN SODIUM SCH MLS/HR: 375; 3 INJECTION, SOLUTION INTRAVENOUS at 17:00

## 2018-04-02 RX ADMIN — HYDROMORPHONE HCL-SODIUM CHLORIDE 0.9% INJ 6 MG/30ML SCH MG: 0.2 SOLUTION at 16:58

## 2018-04-02 RX ADMIN — MORPHINE SULFATE PRN MG: 2 INJECTION, SOLUTION INTRAMUSCULAR; INTRAVENOUS at 11:49

## 2018-04-02 RX ADMIN — POTASSIUM CHLORIDE, DEXTROSE MONOHYDRATE AND SODIUM CHLORIDE SCH MLS/HR: 150; 5; 450 INJECTION, SOLUTION INTRAVENOUS at 08:38

## 2018-04-02 RX ADMIN — ACETAMINOPHEN SCH MLS/HR: 10 INJECTION, SOLUTION INTRAVENOUS at 18:06

## 2018-04-02 RX ADMIN — MORPHINE SULFATE PRN MG: 2 INJECTION, SOLUTION INTRAMUSCULAR; INTRAVENOUS at 03:28

## 2018-04-02 RX ADMIN — POTASSIUM CHLORIDE, DEXTROSE MONOHYDRATE AND SODIUM CHLORIDE SCH MLS/HR: 150; 5; 450 INJECTION, SOLUTION INTRAVENOUS at 20:45

## 2018-04-02 RX ADMIN — MORPHINE SULFATE PRN MG: 2 INJECTION, SOLUTION INTRAMUSCULAR; INTRAVENOUS at 00:22

## 2018-04-02 RX ADMIN — MORPHINE SULFATE PRN MG: 2 INJECTION, SOLUTION INTRAMUSCULAR; INTRAVENOUS at 08:28

## 2018-04-02 RX ADMIN — TAZOBACTAM SODIUM AND PIPERACILLIN SODIUM SCH MLS/HR: 375; 3 INJECTION, SOLUTION INTRAVENOUS at 04:17

## 2018-04-02 RX ADMIN — POTASSIUM CHLORIDE, DEXTROSE MONOHYDRATE AND SODIUM CHLORIDE SCH MLS/HR: 150; 5; 450 INJECTION, SOLUTION INTRAVENOUS at 17:00

## 2018-04-02 NOTE — PD.OP
cc:   Elroy Leslie MD


__________________________________________________





Operative Report


Date of Surgery:  Apr 2, 2018


Preoperative Diagnosis:  


(1) Diverticulitis of large intestine with abscess without bleeding


Postoperative Diagnosis:  


(1) Diverticulitis of large intestine with abscess without bleeding


Procedure:


Laparoscopic drainage of intraabdominal abscess


Exploratory laparotomy


Left colectomy


Takedown of splenic flexure


End colostomy


Incision and drainage abscess of abdominal wall


Anesthesia:


MARK


Surgeon:


Elroy Leslie


Assistant(s):


Magda TEE


Operation and Findings:


EBL: 250cc





Operative findings: inflamed indurated descending and sigmoid colon with 

multiple abscesses with large amt of pus.  Unable to be treated with 

laparoscopic drainage alone or diverting ileostomy.  Descending and sigmoid 

colon resected with takedown of splenic flexure and end colostomy,.  





Procedure in detail: The patient was taken to the operating room placed in 

supine position.  General endotracheal anesthesia was induced and the abdomen 

is prepped and draped in usual sterile fashion.  Surgical timeout was performed 

to verify correct patient procedure and site.  The left lower quadrant 

accordion drain was prepped into the field.  Local anesthetic was injected in 

the skin and subcutaneous tissue in the right upper abdomen and a 5 mm incision 

made.  The abdomen was entered with the Optiview trocar with laparoscope in 

place without difficulty.  The abdomen was insufflated to 15 mmHg with CO2 gas 

which the patient tolerated well.  He is placed in reverse Trendelenburg 

position turned slightly to the right.  A 5 mm port was placed in the right 

lower abdomen and one in the left upper abdomen.  Attention was turned to the 

left lower quadrant.  There was severe inflammation and thickening of the 

descending and sigmoid colon.  A loop of small bowel was adherent to the 

sigmoid colon.  Careful meticulous lysis of adhesions was performed 

laparoscopically using the EndoShears as well as in some of the inflamed areas 

with the suction .  A couple of very large abscess cavities along the 

descending and sigmoid colon and the left lateral abdominal wall were entered 

and a large amount of pus removed.  It became clear the patient would require 

colon resection as well as more lysis of adhesions and decision was made to 

open the patient.





A midline laparotomy was then performed.  The induration was very dense and the 

tissue was hardened.  I bluntly dissected the loop of small intestine away from 

the inflamed sigmoid colon.  The small bowel mesentery was really adherent as 

opposed to the serosa and there is no injury to the bowel or the mesentery.  

The Bookwalter retractor was then placed.  Again blunt finger dissection was 

used to separate some of the inflamed lateral descending and sigmoid colon from 

the lateral abdominal wall.  There was invasion of the abscess into the lateral 

abdominal wall musculature and a small amount of muscle was dissected with the 

specimen.  The left white line of Toldt was taken down carefully with 

electrocautery and with blunt finger dissection.  Due to the extent of the 

inflammation into the descending colon the splenic flexure was taken down.  The 

splenic colic ligament was able to be bluntly freed.  The gastrocolic omentum 

was  from the transverse colon in the avascular plane using 

electrocautery.  At this point the distal sigmoid colon was transected with the 

green load contour stapler.  A Prolene stitch was placed on the rectal stump 

for future identification.  The sigmoid and descending colon mesentery was 

taken down using the harmonic wave.  The left colic vessel was ligated with 0 

Vicryl suture.  An area in the proximal descending colon was healthy appearing 

and not inflamed or thickened and this was transected with the ANTOINETTE 75 mm blue 

load.  The abdomen was copiously irrigated with at least 3 L of warm normal 

saline especially laterally in the area of the abscess cavities.  The 

previously placed accordion drain was in the anterior abdominal wall and was 

removed.  The accordion drain was in the left lower abdomen and therefore in 

the left upper abdomen a circular skin incision was made in the fascia 

dissected in a cruciate fashion to prepare for the colostomy.  2 fingers were 

able to be advanced through the fascial incision.  The colostomy was brought up 

through the stoma site with no tension.  A 19 Indonesian round drain was placed 

through the left upper abdominal 5 mm port and secured with 3-0 nylon and 

placed along the left paracolic gutter into the pelvis.  Seprafilm was placed.  

The midline fascia was closed with running #1 looped PDS suture.  The wound was 

copiously irrigated.  Skin was closed with skin staplers and a moon dressing 

was applied.  The staple line of the colon was removed and the colostomy 

matured using 3-0 Vicryl suture.  An appliance was placed.  In the left lower 

quadrant at the site of the previous accordion drain, there is a purulent fluid 

collection continuing to drain pus.  Therefore I increased the incision to 

about 1 cm and irrigated it.  A Betadine soaked Telfa was placed as packing 

into this incision.  A dressing was applied.  The patient tolerated the 

procedure well and was extubated and taken to PACU in stable condition.  Sponge 

management counts were correct.











Elroy Leslie MD Apr 2, 2018 16:24

## 2018-04-02 NOTE — PD.WCN.NOT
Wound Consult


Description:


Consult for NEW OSTOMY TEACHING LUQ per Dr Leslie


Communicated with:


GAEL Delaney


Recommendation:


Patient is off unit and has not returned from surgery. Educational materials 

left in patient room.


Additional Information:


Patient not seen. Patient is off unit.











Nataliya Bill [facility] Apr 2, 2018 17:20

## 2018-04-02 NOTE — EKG
Date Performed: 04/02/2018       Time Performed: 06:02:58

 

PTAGE:      57 years

 

EKG:      Sinus bradycardia Mild QT prolongation Borderline ECG 

 

NO PREVIOUS TRACING            

 

DOCTOR:   Mohsen Kennedy  Interpretating Date/Time  04/02/2018 08:18:50

## 2018-04-02 NOTE — HHI.PR
__________________________________________________





Subjective


Subjective Notes


Still having pain and bloating. Some stool and flatus.





Objective


Vitals/I&O





Vital Signs








  Date Time  Temp Pulse Resp B/P (MAP) Pulse Ox O2 Delivery O2 Flow Rate FiO2


 


4/2/18 03:33   18     


 


4/2/18 00:35 98.6 65  121/74 (90) 95   


 


3/30/18 18:53      Room Air  








Radiology





Last Impressions








Abdomen/Pelvis CT 3/30/18 1308 Signed





Impressions: 





 Service Date/Time:  Friday, March 30, 2018 15:49 - CONCLUSION:  1. Sigmoid 





 diverticulitis 2. Moderate sized perisigmoid diverticular abscess with 





 significant enlargement compared to the prior study. 3. Second extra 

intestinal 





 air filled collection along the mid descending colon representing either air 





 tracking from the lower collection or new perforation. 4. No significant ileus

, 





 free air or other fluid collections. 5. Otherwise stable evaluation.     Thomas Radford MD 


 


Abdomen X-Ray 3/30/18 1308 Signed





Impressions: 





 Service Date/Time:  Friday, March 30, 2018 14:07 - CONCLUSION:  1. Nonspecific 





 bowel gas pattern with no evidence of free air. 2. Atelectasis in the lung 





 bases.     Anuj Garcia MD 








Narrative Exam


NAD


Abd: soft, tender in LLQ, accordion with 310cc/24h pus and brown fluid ? stool





A/P


Assessment and Plan


56 yo M with large peridiverticular abscess, s/p CT guided drainage 3/31.  





Proceed to OR this afternoon.  Dx lap, diverting ileostomy, possible open 

Fidel's procedure.  Discussed there are a variety of options depending on 

how the bowel appears and if a colocutaneous fistula is present.  He 

understands and desires to proceed.











Elroy Leslie MD Apr 2, 2018 08:47

## 2018-04-03 VITALS
SYSTOLIC BLOOD PRESSURE: 108 MMHG | DIASTOLIC BLOOD PRESSURE: 60 MMHG | OXYGEN SATURATION: 92 % | TEMPERATURE: 97.1 F | RESPIRATION RATE: 16 BRPM | HEART RATE: 69 BPM

## 2018-04-03 VITALS
SYSTOLIC BLOOD PRESSURE: 127 MMHG | OXYGEN SATURATION: 94 % | DIASTOLIC BLOOD PRESSURE: 72 MMHG | HEART RATE: 63 BPM | RESPIRATION RATE: 19 BRPM | TEMPERATURE: 97.4 F

## 2018-04-03 VITALS
SYSTOLIC BLOOD PRESSURE: 126 MMHG | HEART RATE: 57 BPM | DIASTOLIC BLOOD PRESSURE: 76 MMHG | TEMPERATURE: 98.7 F | RESPIRATION RATE: 17 BRPM | OXYGEN SATURATION: 94 %

## 2018-04-03 VITALS
HEART RATE: 67 BPM | DIASTOLIC BLOOD PRESSURE: 77 MMHG | RESPIRATION RATE: 18 BRPM | TEMPERATURE: 98.5 F | OXYGEN SATURATION: 96 % | SYSTOLIC BLOOD PRESSURE: 130 MMHG

## 2018-04-03 VITALS
OXYGEN SATURATION: 96 % | HEART RATE: 59 BPM | RESPIRATION RATE: 18 BRPM | DIASTOLIC BLOOD PRESSURE: 69 MMHG | SYSTOLIC BLOOD PRESSURE: 114 MMHG | TEMPERATURE: 97.3 F

## 2018-04-03 LAB
BASOPHILS # BLD AUTO: 0 TH/MM3 (ref 0–0.2)
BASOPHILS NFR BLD: 0 % (ref 0–2)
BUN SERPL-MCNC: 11 MG/DL (ref 7–18)
CALCIUM SERPL-MCNC: 8.1 MG/DL (ref 8.5–10.1)
CHLORIDE SERPL-SCNC: 106 MEQ/L (ref 98–107)
CREAT SERPL-MCNC: 0.74 MG/DL (ref 0.6–1.3)
EOSINOPHIL # BLD: 0 TH/MM3 (ref 0–0.4)
EOSINOPHIL NFR BLD: 0.1 % (ref 0–4)
ERYTHROCYTE [DISTWIDTH] IN BLOOD BY AUTOMATED COUNT: 12.5 % (ref 11.6–17.2)
GFR SERPLBLD BASED ON 1.73 SQ M-ARVRAT: 110 ML/MIN (ref 89–?)
GLUCOSE SERPL-MCNC: 143 MG/DL (ref 74–106)
HCO3 BLD-SCNC: 27.6 MEQ/L (ref 21–32)
HCT VFR BLD CALC: 32.3 % (ref 39–51)
HGB BLD-MCNC: 10.9 GM/DL (ref 13–17)
LYMPHOCYTES # BLD AUTO: 1 TH/MM3 (ref 1–4.8)
LYMPHOCYTES NFR BLD AUTO: 12.4 % (ref 9–44)
MCH RBC QN AUTO: 30.8 PG (ref 27–34)
MCHC RBC AUTO-ENTMCNC: 33.9 % (ref 32–36)
MCV RBC AUTO: 90.8 FL (ref 80–100)
MONOCYTE #: 0.7 TH/MM3 (ref 0–0.9)
MONOCYTES NFR BLD: 9.6 % (ref 0–8)
NEUTROPHILS # BLD AUTO: 6.1 TH/MM3 (ref 1.8–7.7)
NEUTROPHILS NFR BLD AUTO: 77.9 % (ref 16–70)
PLATELET # BLD: 430 TH/MM3 (ref 150–450)
PMV BLD AUTO: 7.8 FL (ref 7–11)
RBC # BLD AUTO: 3.55 MIL/MM3 (ref 4.5–5.9)
SODIUM SERPL-SCNC: 141 MEQ/L (ref 136–145)
WBC # BLD AUTO: 7.8 TH/MM3 (ref 4–11)

## 2018-04-03 RX ADMIN — TAZOBACTAM SODIUM AND PIPERACILLIN SODIUM SCH MLS/HR: 375; 3 INJECTION, SOLUTION INTRAVENOUS at 00:31

## 2018-04-03 RX ADMIN — Medication SCH ML: at 21:00

## 2018-04-03 RX ADMIN — CLONIDINE HYDROCHLORIDE SCH MG: 0.1 INJECTION, SOLUTION EPIDURAL at 23:29

## 2018-04-03 RX ADMIN — ACETAMINOPHEN SCH MLS/HR: 10 INJECTION, SOLUTION INTRAVENOUS at 00:00

## 2018-04-03 RX ADMIN — HYDROMORPHONE HCL-SODIUM CHLORIDE 0.9% INJ 6 MG/30ML SCH MG: 0.2 SOLUTION at 00:35

## 2018-04-03 RX ADMIN — TAZOBACTAM SODIUM AND PIPERACILLIN SODIUM SCH MLS/HR: 375; 3 INJECTION, SOLUTION INTRAVENOUS at 23:29

## 2018-04-03 RX ADMIN — ONDANSETRON PRN MG: 2 INJECTION, SOLUTION INTRAMUSCULAR; INTRAVENOUS at 23:29

## 2018-04-03 RX ADMIN — LEVOTHYROXINE SODIUM SCH MCG: 100 TABLET ORAL at 05:55

## 2018-04-03 RX ADMIN — POTASSIUM CHLORIDE, DEXTROSE MONOHYDRATE AND SODIUM CHLORIDE SCH MLS/HR: 150; 5; 450 INJECTION, SOLUTION INTRAVENOUS at 14:33

## 2018-04-03 RX ADMIN — LEVOTHYROXINE SODIUM SCH MCG: 75 TABLET ORAL at 05:55

## 2018-04-03 RX ADMIN — HYDROMORPHONE HCL-SODIUM CHLORIDE 0.9% INJ 6 MG/30ML SCH MG: 0.2 SOLUTION at 11:08

## 2018-04-03 RX ADMIN — CLONIDINE HYDROCHLORIDE SCH MG: 0.1 INJECTION, SOLUTION EPIDURAL at 11:10

## 2018-04-03 RX ADMIN — ACETAMINOPHEN SCH MLS/HR: 10 INJECTION, SOLUTION INTRAVENOUS at 12:19

## 2018-04-03 RX ADMIN — ACETAMINOPHEN SCH MLS/HR: 10 INJECTION, SOLUTION INTRAVENOUS at 05:56

## 2018-04-03 RX ADMIN — POTASSIUM CHLORIDE, DEXTROSE MONOHYDRATE AND SODIUM CHLORIDE SCH MLS/HR: 150; 5; 450 INJECTION, SOLUTION INTRAVENOUS at 23:16

## 2018-04-03 RX ADMIN — TAZOBACTAM SODIUM AND PIPERACILLIN SODIUM SCH MLS/HR: 375; 3 INJECTION, SOLUTION INTRAVENOUS at 16:39

## 2018-04-03 RX ADMIN — TAZOBACTAM SODIUM AND PIPERACILLIN SODIUM SCH MLS/HR: 375; 3 INJECTION, SOLUTION INTRAVENOUS at 04:37

## 2018-04-03 RX ADMIN — CLONIDINE HYDROCHLORIDE SCH MG: 0.1 INJECTION, SOLUTION EPIDURAL at 04:38

## 2018-04-03 RX ADMIN — TAZOBACTAM SODIUM AND PIPERACILLIN SODIUM SCH MLS/HR: 375; 3 INJECTION, SOLUTION INTRAVENOUS at 11:10

## 2018-04-03 RX ADMIN — POTASSIUM CHLORIDE, DEXTROSE MONOHYDRATE AND SODIUM CHLORIDE SCH MLS/HR: 150; 5; 450 INJECTION, SOLUTION INTRAVENOUS at 04:34

## 2018-04-03 RX ADMIN — CLONIDINE HYDROCHLORIDE SCH MG: 0.1 INJECTION, SOLUTION EPIDURAL at 00:36

## 2018-04-03 RX ADMIN — CLONIDINE HYDROCHLORIDE SCH MG: 0.1 INJECTION, SOLUTION EPIDURAL at 16:39

## 2018-04-03 RX ADMIN — Medication SCH ML: at 08:10

## 2018-04-03 NOTE — HHI.PR
__________________________________________________





Subjective


Subjective Notes


No major complaints. Some abdominal pain post op. Stable overnight.





Objective


Vitals/I&O





Vital Signs








  Date Time  Temp Pulse Resp B/P (MAP) Pulse Ox O2 Delivery O2 Flow Rate FiO2


 


4/3/18 06:00   18     


 


4/3/18 00:00 97.1 69  108/60 (76) 92   


 


4/2/18 17:30      Nasal Cannula 3 








Labs





Laboratory Tests








Test


  4/3/18


05:17


 


White Blood Count 7.8 


 


Red Blood Count 3.55 


 


Hemoglobin 10.9 


 


Hematocrit 32.3 


 


Mean Corpuscular Volume 90.8 


 


Mean Corpuscular Hemoglobin 30.8 


 


Mean Corpuscular Hemoglobin


Concent 33.9 


 


 


Red Cell Distribution Width 12.5 


 


Platelet Count 430 


 


Mean Platelet Volume 7.8 


 


Neutrophils (%) (Auto) 77.9 


 


Lymphocytes (%) (Auto) 12.4 


 


Monocytes (%) (Auto) 9.6 


 


Eosinophils (%) (Auto) 0.1 


 


Basophils (%) (Auto) 0.0 


 


Neutrophils # (Auto) 6.1 


 


Lymphocytes # (Auto) 1.0 


 


Monocytes # (Auto) 0.7 


 


Eosinophils # (Auto) 0.0 


 


Basophils # (Auto) 0.0 


 


CBC Comment DIFF FINAL 


 


Differential Comment  


 


Blood Urea Nitrogen 11 


 


Creatinine 0.74 


 


Random Glucose 143 


 


Calcium Level 8.1 


 


Sodium Level 141 


 


Potassium Level 4.2 


 


Chloride Level 106 


 


Carbon Dioxide Level 27.6 


 


Anion Gap 7 


 


Estimat Glomerular Filtration


Rate 110 


 








Radiology





Last Impressions








Abdomen/Pelvis CT 3/30/18 1308 Signed





Impressions: 





 Service Date/Time:  Friday, March 30, 2018 15:49 - CONCLUSION:  1. Sigmoid 





 diverticulitis 2. Moderate sized perisigmoid diverticular abscess with 





 significant enlargement compared to the prior study. 3. Second extra 

intestinal 





 air filled collection along the mid descending colon representing either air 





 tracking from the lower collection or new perforation. 4. No significant ileus

, 





 free air or other fluid collections. 5. Otherwise stable evaluation.     Thomas Radford MD 


 


Abdomen X-Ray 3/30/18 1308 Signed





Impressions: 





 Service Date/Time:  Friday, March 30, 2018 14:07 - CONCLUSION:  1. Nonspecific 





 bowel gas pattern with no evidence of free air. 2. Atelectasis in the lung 





 bases.     Anuj Garcia MD 








Narrative Exam


NAD


Abd: post op ttp, SONJA dressing in place, colostomy edematous and viable, ADELA ss 

output, LLQ dressing in place


Khalil jose urine





A/P


Assessment and Plan


58 yo M with large peridiverticular abscess, s/p CT guided drainage 3/31, POD 1 

ex lap, left colectomy, end colostomy





Stable post op.





Cont ADELA drain, SONJA dressing, Khalil catheter. 


Cont IV antibiotics and PCA.


Needs to be OOB in chair and ambulating. 


DVT proph: Elroy Moore MD Apr 3, 2018 08:02

## 2018-04-04 VITALS
SYSTOLIC BLOOD PRESSURE: 159 MMHG | OXYGEN SATURATION: 97 % | RESPIRATION RATE: 17 BRPM | HEART RATE: 56 BPM | TEMPERATURE: 97.6 F | DIASTOLIC BLOOD PRESSURE: 95 MMHG

## 2018-04-04 VITALS
HEART RATE: 62 BPM | RESPIRATION RATE: 19 BRPM | OXYGEN SATURATION: 97 % | SYSTOLIC BLOOD PRESSURE: 150 MMHG | TEMPERATURE: 97.3 F | DIASTOLIC BLOOD PRESSURE: 78 MMHG

## 2018-04-04 VITALS
RESPIRATION RATE: 18 BRPM | TEMPERATURE: 97.2 F | HEART RATE: 58 BPM | OXYGEN SATURATION: 95 % | SYSTOLIC BLOOD PRESSURE: 131 MMHG | DIASTOLIC BLOOD PRESSURE: 81 MMHG

## 2018-04-04 VITALS
OXYGEN SATURATION: 100 % | DIASTOLIC BLOOD PRESSURE: 88 MMHG | SYSTOLIC BLOOD PRESSURE: 139 MMHG | TEMPERATURE: 97.9 F | RESPIRATION RATE: 20 BRPM | HEART RATE: 68 BPM

## 2018-04-04 VITALS
TEMPERATURE: 97.3 F | RESPIRATION RATE: 18 BRPM | HEART RATE: 71 BPM | OXYGEN SATURATION: 92 % | DIASTOLIC BLOOD PRESSURE: 82 MMHG | SYSTOLIC BLOOD PRESSURE: 137 MMHG

## 2018-04-04 VITALS
RESPIRATION RATE: 18 BRPM | SYSTOLIC BLOOD PRESSURE: 132 MMHG | DIASTOLIC BLOOD PRESSURE: 84 MMHG | HEART RATE: 64 BPM | OXYGEN SATURATION: 94 % | TEMPERATURE: 97.9 F

## 2018-04-04 RX ADMIN — TAZOBACTAM SODIUM AND PIPERACILLIN SODIUM SCH MLS/HR: 375; 3 INJECTION, SOLUTION INTRAVENOUS at 17:09

## 2018-04-04 RX ADMIN — SUCRALFATE SCH GM: 1 SUSPENSION ORAL at 17:09

## 2018-04-04 RX ADMIN — TAZOBACTAM SODIUM AND PIPERACILLIN SODIUM SCH MLS/HR: 375; 3 INJECTION, SOLUTION INTRAVENOUS at 23:37

## 2018-04-04 RX ADMIN — SUCRALFATE SCH GM: 1 SUSPENSION ORAL at 12:52

## 2018-04-04 RX ADMIN — Medication SCH ML: at 08:31

## 2018-04-04 RX ADMIN — CLONIDINE HYDROCHLORIDE SCH MG: 0.1 INJECTION, SOLUTION EPIDURAL at 17:09

## 2018-04-04 RX ADMIN — LEVOTHYROXINE SODIUM SCH MCG: 75 TABLET ORAL at 05:34

## 2018-04-04 RX ADMIN — LEVOTHYROXINE SODIUM SCH MCG: 100 TABLET ORAL at 05:34

## 2018-04-04 RX ADMIN — PANTOPRAZOLE SCH MG: 40 TABLET, DELAYED RELEASE ORAL at 10:55

## 2018-04-04 RX ADMIN — SUCRALFATE SCH GM: 1 SUSPENSION ORAL at 20:42

## 2018-04-04 RX ADMIN — TAZOBACTAM SODIUM AND PIPERACILLIN SODIUM SCH MLS/HR: 375; 3 INJECTION, SOLUTION INTRAVENOUS at 05:35

## 2018-04-04 RX ADMIN — OXYCODONE HYDROCHLORIDE AND ACETAMINOPHEN PRN TAB: 5; 325 TABLET ORAL at 23:37

## 2018-04-04 RX ADMIN — OXYCODONE HYDROCHLORIDE AND ACETAMINOPHEN PRN TAB: 5; 325 TABLET ORAL at 12:51

## 2018-04-04 RX ADMIN — POTASSIUM CHLORIDE, DEXTROSE MONOHYDRATE AND SODIUM CHLORIDE SCH MLS/HR: 150; 5; 450 INJECTION, SOLUTION INTRAVENOUS at 08:31

## 2018-04-04 RX ADMIN — CLONIDINE HYDROCHLORIDE SCH MG: 0.1 INJECTION, SOLUTION EPIDURAL at 05:35

## 2018-04-04 RX ADMIN — CLONIDINE HYDROCHLORIDE SCH MG: 0.1 INJECTION, SOLUTION EPIDURAL at 10:56

## 2018-04-04 RX ADMIN — ONDANSETRON PRN MG: 2 INJECTION, SOLUTION INTRAMUSCULAR; INTRAVENOUS at 15:32

## 2018-04-04 RX ADMIN — CLONIDINE HYDROCHLORIDE SCH MG: 0.1 INJECTION, SOLUTION EPIDURAL at 23:38

## 2018-04-04 RX ADMIN — TAZOBACTAM SODIUM AND PIPERACILLIN SODIUM SCH MLS/HR: 375; 3 INJECTION, SOLUTION INTRAVENOUS at 10:56

## 2018-04-04 RX ADMIN — HYDROMORPHONE HCL-SODIUM CHLORIDE 0.9% INJ 6 MG/30ML SCH MG: 0.2 SOLUTION at 02:19

## 2018-04-04 RX ADMIN — Medication SCH ML: at 20:40

## 2018-04-04 RX ADMIN — POTASSIUM CHLORIDE, DEXTROSE MONOHYDRATE AND SODIUM CHLORIDE SCH MLS/HR: 150; 5; 450 INJECTION, SOLUTION INTRAVENOUS at 10:57

## 2018-04-04 RX ADMIN — OXYCODONE HYDROCHLORIDE AND ACETAMINOPHEN PRN TAB: 5; 325 TABLET ORAL at 18:38

## 2018-04-04 RX ADMIN — SUCRALFATE SCH GM: 1 SUSPENSION ORAL at 17:00

## 2018-04-04 NOTE — HHI.FF
Face to Face Verification


Diagnosis:  


(1) Colostomy in place


(2) Status post colectomy


(3) Diverticulitis of large intestine with abscess without bleeding


Home Health Nursing








Order: Signs/symptoms of disease process





 Wound care and dressing changes








Instructions:


assist with colostomy care





DAELA drain care





Change packing LLQ wound with iodoform daily











I have seen patient Pacheco Kenny on 4/4/18. My clinical findings support 

the need for the requested home health care services because:








 Ltd mobility - disease progression





 Deconditioned w/ increased weakness





 Limited ability to care for self














I certify that my clinical findings support that this patient is homebound 

because:








 Post-op weakness





 Need for psychosocial assistance

















Elroy Leslie MD Apr 4, 2018 11:35

## 2018-04-04 NOTE — PD.[email or web address].NOT
Wound Consult


Description:


Consult for NEW OSTOMY TEACHING LUQ per Dr Leslie


Communicated with:


SABINE Simon Dr


Patient


Recommendation:


Honey Ostomy RN for concerns/questions regarding ostomy.


Reference educational booklet left at bedside for reinforcement of teaching 

today.


Ensure the pouch is closed at the bottom if emptied.


Ensure the pouch is closed at the flange if air is released.


Continue to monitor stoma for color and output.


Additional Information:


Patient seen on [facility] for ostomy assessment and teaching.





Ostomy


Type:  Colostomy


Surgeon:  Elroy Leslie MD


Date of Surgery:  Apr 2, 2018


Complete:  Education materials


Educated patient on:


Stoma color


Stoma output


Size of stoma


Supplies available in cut to fit or moldable and one or two pieces


Additional information


Patient seen on [facility] after speaking with SABINE Simon and Dr Leslie regarding 

patient. Writer entered room to find patient sitting up in chair with his 

girlfriend visiting. Upon introducing self, the girlfriend left the patient 

side and moved to the opposite side of the room. During teaching, patient 

girlfriend left the room and returned shortly crying and saying she needed to 

leave and go home that the machine just took her money. When asked if there was 

anything writer could do she states no she just needs to leave and does not 

want to come back tomorrow. It was explained that writer could come back at 

another time and patient stated no that he wanted writer to stay. Patient 

girlfriend said goodbye to patient and left the room. Patient began to explain 

family dynamics over the next 5-10 minutes. Patient was emotional, however 

redirected himself back to the previous discussion regarding his ostomy, 

supplies available, and had questions that were answered. Stoma is located on 

the left side abdomen, pale pink, round, moist, functioning with flatus only at 

this time, minimal sanguinous drainage noted in pouch that was not emptied. 

Stoma measured ~1 3/4".











[facility] Apr 4, 2018 13:42

## 2018-04-04 NOTE — HHI.PR
__________________________________________________





Subjective


Subjective Notes


C/o pain and overall not feeling great. Didn't sleep much. Catheter had to be 

replaced.





Objective


Vitals/I&O





Vital Signs








  Date Time  Temp Pulse Resp B/P (MAP) Pulse Ox O2 Delivery O2 Flow Rate FiO2


 


4/4/18 08:00 97.2 58 18 131/81 (98) 95   


 


4/2/18 17:30      Nasal Cannula 3 








Radiology





Last Impressions








Abdomen/Pelvis CT 3/30/18 1308 Signed





Impressions: 





 Service Date/Time:  Friday, March 30, 2018 15:49 - CONCLUSION:  1. Sigmoid 





 diverticulitis 2. Moderate sized perisigmoid diverticular abscess with 





 significant enlargement compared to the prior study. 3. Second extra 

intestinal 





 air filled collection along the mid descending colon representing either air 





 tracking from the lower collection or new perforation. 4. No significant ileus

, 





 free air or other fluid collections. 5. Otherwise stable evaluation.     Thomas Radford MD 


 


Abdomen X-Ray 3/30/18 1308 Signed





Impressions: 





 Service Date/Time:  Friday, March 30, 2018 14:07 - CONCLUSION:  1. Nonspecific 





 bowel gas pattern with no evidence of free air. 2. Atelectasis in the lung 





 bases.     Anuj Garcia MD 








Narrative Exam


NAD


Abd: post op ttp, SONJA dressing in place, colostomy edematous and viable with 

flatus, ADELA ss output cloudy, LLQ packing removed- no erythema or purulence


Khalil jose urine





A/P


Assessment and Plan


56 yo M with large peridiverticular abscess, s/p CT guided drainage 3/31, POD 2 

ex lap, left colectomy, end colostomy





Stable post op. 





Packing changes to LLQ wound daily.


Cont ADELA drain, SONJA dressing, Khalil catheter. 


Cont IV antibiotics and PCA, toradol.  Add percocet.


Needs to be OOB in chair and ambulating. 


DVT proph: lovenox


Will likely be here another 3-4 days.











Elroy Leslie MD Apr 4, 2018 11:32

## 2018-04-05 VITALS
HEART RATE: 50 BPM | DIASTOLIC BLOOD PRESSURE: 91 MMHG | SYSTOLIC BLOOD PRESSURE: 157 MMHG | OXYGEN SATURATION: 99 % | TEMPERATURE: 98 F | RESPIRATION RATE: 20 BRPM

## 2018-04-05 VITALS
DIASTOLIC BLOOD PRESSURE: 92 MMHG | TEMPERATURE: 97.8 F | OXYGEN SATURATION: 95 % | HEART RATE: 61 BPM | RESPIRATION RATE: 16 BRPM | SYSTOLIC BLOOD PRESSURE: 158 MMHG

## 2018-04-05 VITALS
DIASTOLIC BLOOD PRESSURE: 91 MMHG | TEMPERATURE: 97.3 F | SYSTOLIC BLOOD PRESSURE: 145 MMHG | RESPIRATION RATE: 18 BRPM | HEART RATE: 58 BPM | OXYGEN SATURATION: 96 %

## 2018-04-05 VITALS
RESPIRATION RATE: 20 BRPM | OXYGEN SATURATION: 98 % | SYSTOLIC BLOOD PRESSURE: 164 MMHG | TEMPERATURE: 98.3 F | DIASTOLIC BLOOD PRESSURE: 88 MMHG | HEART RATE: 52 BPM

## 2018-04-05 VITALS
OXYGEN SATURATION: 96 % | SYSTOLIC BLOOD PRESSURE: 146 MMHG | HEART RATE: 52 BPM | RESPIRATION RATE: 17 BRPM | TEMPERATURE: 97.9 F | DIASTOLIC BLOOD PRESSURE: 88 MMHG

## 2018-04-05 RX ADMIN — LEVOTHYROXINE SODIUM SCH MCG: 100 TABLET ORAL at 05:54

## 2018-04-05 RX ADMIN — SUCRALFATE SCH GM: 1 SUSPENSION ORAL at 11:00

## 2018-04-05 RX ADMIN — CLONIDINE HYDROCHLORIDE SCH MG: 0.1 INJECTION, SOLUTION EPIDURAL at 23:39

## 2018-04-05 RX ADMIN — SUCRALFATE SCH GM: 1 SUSPENSION ORAL at 17:00

## 2018-04-05 RX ADMIN — CLONIDINE HYDROCHLORIDE SCH MG: 0.1 INJECTION, SOLUTION EPIDURAL at 05:54

## 2018-04-05 RX ADMIN — TAZOBACTAM SODIUM AND PIPERACILLIN SODIUM SCH MLS/HR: 375; 3 INJECTION, SOLUTION INTRAVENOUS at 10:59

## 2018-04-05 RX ADMIN — SUCRALFATE SCH GM: 1 SUSPENSION ORAL at 07:43

## 2018-04-05 RX ADMIN — Medication SCH ML: at 07:43

## 2018-04-05 RX ADMIN — OXYCODONE HYDROCHLORIDE AND ACETAMINOPHEN PRN TAB: 5; 325 TABLET ORAL at 14:57

## 2018-04-05 RX ADMIN — POTASSIUM CHLORIDE, DEXTROSE MONOHYDRATE AND SODIUM CHLORIDE SCH MLS/HR: 150; 5; 450 INJECTION, SOLUTION INTRAVENOUS at 02:05

## 2018-04-05 RX ADMIN — TAZOBACTAM SODIUM AND PIPERACILLIN SODIUM SCH MLS/HR: 375; 3 INJECTION, SOLUTION INTRAVENOUS at 17:12

## 2018-04-05 RX ADMIN — OXYCODONE HYDROCHLORIDE AND ACETAMINOPHEN PRN TAB: 5; 325 TABLET ORAL at 05:53

## 2018-04-05 RX ADMIN — CLONIDINE HYDROCHLORIDE SCH MG: 0.1 INJECTION, SOLUTION EPIDURAL at 10:59

## 2018-04-05 RX ADMIN — OXYCODONE HYDROCHLORIDE AND ACETAMINOPHEN PRN TAB: 5; 325 TABLET ORAL at 20:14

## 2018-04-05 RX ADMIN — TAZOBACTAM SODIUM AND PIPERACILLIN SODIUM SCH MLS/HR: 375; 3 INJECTION, SOLUTION INTRAVENOUS at 05:59

## 2018-04-05 RX ADMIN — PANTOPRAZOLE SCH MG: 40 TABLET, DELAYED RELEASE ORAL at 07:43

## 2018-04-05 RX ADMIN — LEVOTHYROXINE SODIUM SCH MCG: 75 TABLET ORAL at 05:54

## 2018-04-05 RX ADMIN — CLONIDINE HYDROCHLORIDE SCH MG: 0.1 INJECTION, SOLUTION EPIDURAL at 17:13

## 2018-04-05 RX ADMIN — SUCRALFATE SCH GM: 1 SUSPENSION ORAL at 20:15

## 2018-04-05 RX ADMIN — TAZOBACTAM SODIUM AND PIPERACILLIN SODIUM SCH MLS/HR: 375; 3 INJECTION, SOLUTION INTRAVENOUS at 23:39

## 2018-04-05 RX ADMIN — Medication SCH ML: at 20:15

## 2018-04-05 RX ADMIN — OXYCODONE HYDROCHLORIDE AND ACETAMINOPHEN PRN TAB: 5; 325 TABLET ORAL at 09:23

## 2018-04-05 RX ADMIN — HYDROMORPHONE HCL-SODIUM CHLORIDE 0.9% INJ 6 MG/30ML SCH MG: 0.2 SOLUTION at 07:58

## 2018-04-05 NOTE — HHI.PR
__________________________________________________





Subjective


Subjective Notes


Having flatus in bag.  C/o pain in LLQ, but using PCA less.  Not tolerating 

much orally.





Objective


Vitals/I&O





Vital Signs








  Date Time  Temp Pulse Resp B/P (MAP) Pulse Ox O2 Delivery O2 Flow Rate FiO2


 


4/5/18 08:00 97.3 58 18 145/91 (109) 96   


 


4/2/18 17:30      Nasal Cannula 3 








Radiology





Last Impressions








Abdomen/Pelvis CT 3/30/18 1308 Signed





Impressions: 





 Service Date/Time:  Friday, March 30, 2018 15:49 - CONCLUSION:  1. Sigmoid 





 diverticulitis 2. Moderate sized perisigmoid diverticular abscess with 





 significant enlargement compared to the prior study. 3. Second extra 

intestinal 





 air filled collection along the mid descending colon representing either air 





 tracking from the lower collection or new perforation. 4. No significant ileus

, 





 free air or other fluid collections. 5. Otherwise stable evaluation.     Thomas Radford MD 


 


Abdomen X-Ray 3/30/18 1308 Signed





Impressions: 





 Service Date/Time:  Friday, March 30, 2018 14:07 - CONCLUSION:  1. Nonspecific 





 bowel gas pattern with no evidence of free air. 2. Atelectasis in the lung 





 bases.     Anuj Garcia MD 








Narrative Exam


NAD


Abd: post op ttp, SONJA dressing in place, colostomy edematous and viable with 

flatus, ADELA ss output cloudy


Garduno jose urine





A/P


Assessment and Plan


56 yo M with large peridiverticular abscess, s/p CT guided drainage 3/31, POD 3 

ex lap, left colectomy, end colostomy





Stable post op. Not tolerating much orally. 





Start soft diet.


Packing changes to LLQ wound daily.


Cont ADELA drain, SONJA dressing. D/c garduno in am. 


Cont IV antibiotics and PCA, toradol, percocet. 


Needs to be OOB in chair and ambulating. 


DVT proph: Elroy Moore MD Apr 5, 2018 12:22

## 2018-04-05 NOTE — [email or web address].NOT
Wound Consult


Description:


Consult for NEW OSTOMY TEACHING LUQ per Dr Leslie


Communicated with:


Aurora Leslie paged for notification of beeping SONJA dressing not maintaining seal


Recommendation:


Honey Ostomy RN for concerns/questions regarding ostomy.


Reference educational booklet left at bedside for reinforcement of teaching 

today.


Ensure the pouch is closed at the bottom if emptied.


Ensure the pouch is closed at the flange if air is released.


Continue to monitor stoma for color and output.


Additional Information:


Patient seen on [address] [place] [address] for ostomy assessment, teaching, ostomy pouching change 

with SONJA dressing change and jani dressing change.





Ostomy


Type:  Colostomy


Surgeon:  Elroy Leslie MD


Date of Surgery:  Apr 2, 2018


Complete:  Starter kit (Sent out today to arrive to patient home Monday 04/09/18

), Education materials, Rx (Prescription left on chart for moldable wafer and 

transparent open ended pouch), Other (Ostomy appliance change using 2 3/4" 

round moldable wafer and transparent open ended pouch)


Educated patient on:


Stoma appearance


Stoma size


Stoma function


How to cleanse peristomal skin


How to use skin prep prior to barrier application


Wafer/barrier size vs stoma size


Additional information


Patient seen on [address] [place] [address] for ostomy assessment, teaching, ostomy pouching system 

change with SONJA dressing change and jani dressing change. Patient was resting in 

bed upon entering room with supplies for ostomy pouching system change. Soon 

after entering patient room there was beeping heard coming from the patient 

gown. Upon assessment it was noted that the beeping was coming from the SONJA 

dressing on patient midline incision. Attention was turned towards the Ostomy 

on the left side abdomen with dark pink colostomy noted as moist, functioning 

with flatus, oval, lumen noted in center of stoma. It appeared that the ostomy 

pouch was underneath the SONJA dressing that was not functioning properly at 

that time. Ostomy pouch was removed using adhesive remover and attempted to do 

so and not disturb the SONJA dressing with plans to reinforce once ostomy 

appliance had been removed to obtain a seal. This was a failed attempt as the 

wafer was encased in the transparent film that was used to reinforce the 

midline incision dressing previously. It was apparent at that time that the 

SONJA dressing needed to be removed. The SONJA was removed along with the ostomy 

pouching system. The midline incision staple line was noted with approximated 

wound margins and cleansed with NS and gauze with periwound skin prepped using 

Cavilon skin barrier film. Midline incision had scant sanguinous exudate noted 

otherwise unremarkable. An extra SONJA dressing was available in patient room 

that was obtained and placed as far up as possible to avoid the groin/tiny 

area. A razor was obtained to remove patient hair superiorly for dressing 

application with size available in patient room. Once the SONJA dressing was in 

place and working properly, attention was directed towards the stoma. Stoma was 

measured @ 1 1/2" oval with sutures noted circumferentially. Peristomal skin 

was unremarkable. Cavilon skin barrier film was applied after cleansing and 

drying peristomal skin with warm water and soft cloths. Size 2 3/4" round 

moldable wafer was applied over and surrounding stoma with a transparent open 

ended pouch placed over accordion moldable wafer. Pouch was closed by patient. 

Patient asked that writer cleanse the garduno catheter site stating that writer 

was more gently than anyone so far taking care of him and that it had not been 

done yet. Catheter cleansing cloths were obtained from the floor SPD room and 

used to cleanse the garduno catheter, penis, and scrotum as instructed on the 

packaging. JANI drain was also noted at this time to be completely inflated and 

was emptied and left in bathroom for I&O's and communicated with Aurora REGALADO. 

Dressing of bordered gauze was removed from JANI drain site, cleansed with wound 

cleanser and gauze, pat dry with gauze and covered with a new bordered gauze 

dressing.











Nataliya Bill [facility] Apr 5, 2018 16:28

## 2018-04-06 VITALS
SYSTOLIC BLOOD PRESSURE: 160 MMHG | OXYGEN SATURATION: 96 % | DIASTOLIC BLOOD PRESSURE: 88 MMHG | HEART RATE: 54 BPM | RESPIRATION RATE: 17 BRPM | TEMPERATURE: 97.9 F

## 2018-04-06 VITALS
DIASTOLIC BLOOD PRESSURE: 86 MMHG | SYSTOLIC BLOOD PRESSURE: 157 MMHG | TEMPERATURE: 98.1 F | HEART RATE: 55 BPM | RESPIRATION RATE: 21 BRPM | OXYGEN SATURATION: 98 %

## 2018-04-06 VITALS
OXYGEN SATURATION: 96 % | TEMPERATURE: 98.2 F | SYSTOLIC BLOOD PRESSURE: 159 MMHG | DIASTOLIC BLOOD PRESSURE: 89 MMHG | RESPIRATION RATE: 18 BRPM | HEART RATE: 55 BPM

## 2018-04-06 VITALS
HEART RATE: 52 BPM | SYSTOLIC BLOOD PRESSURE: 151 MMHG | OXYGEN SATURATION: 98 % | RESPIRATION RATE: 17 BRPM | TEMPERATURE: 97.9 F | DIASTOLIC BLOOD PRESSURE: 91 MMHG

## 2018-04-06 VITALS
SYSTOLIC BLOOD PRESSURE: 155 MMHG | DIASTOLIC BLOOD PRESSURE: 91 MMHG | TEMPERATURE: 97.6 F | OXYGEN SATURATION: 97 % | RESPIRATION RATE: 17 BRPM | HEART RATE: 53 BPM

## 2018-04-06 RX ADMIN — ONDANSETRON PRN MG: 2 INJECTION, SOLUTION INTRAMUSCULAR; INTRAVENOUS at 08:39

## 2018-04-06 RX ADMIN — TAZOBACTAM SODIUM AND PIPERACILLIN SODIUM SCH MLS/HR: 375; 3 INJECTION, SOLUTION INTRAVENOUS at 16:39

## 2018-04-06 RX ADMIN — SUCRALFATE SCH GM: 1 SUSPENSION ORAL at 08:00

## 2018-04-06 RX ADMIN — TAZOBACTAM SODIUM AND PIPERACILLIN SODIUM SCH MLS/HR: 375; 3 INJECTION, SOLUTION INTRAVENOUS at 05:00

## 2018-04-06 RX ADMIN — LEVOTHYROXINE SODIUM SCH MCG: 75 TABLET ORAL at 04:37

## 2018-04-06 RX ADMIN — OXYCODONE HYDROCHLORIDE AND ACETAMINOPHEN PRN TAB: 5; 325 TABLET ORAL at 21:33

## 2018-04-06 RX ADMIN — Medication SCH ML: at 08:33

## 2018-04-06 RX ADMIN — CLONIDINE HYDROCHLORIDE SCH MG: 0.1 INJECTION, SOLUTION EPIDURAL at 16:40

## 2018-04-06 RX ADMIN — Medication SCH ML: at 21:00

## 2018-04-06 RX ADMIN — OXYCODONE HYDROCHLORIDE AND ACETAMINOPHEN PRN TAB: 5; 325 TABLET ORAL at 12:42

## 2018-04-06 RX ADMIN — Medication PRN ML: at 16:41

## 2018-04-06 RX ADMIN — LEVOTHYROXINE SODIUM SCH MCG: 100 TABLET ORAL at 04:38

## 2018-04-06 RX ADMIN — TAZOBACTAM SODIUM AND PIPERACILLIN SODIUM SCH MLS/HR: 375; 3 INJECTION, SOLUTION INTRAVENOUS at 11:06

## 2018-04-06 RX ADMIN — CLONIDINE HYDROCHLORIDE SCH MG: 0.1 INJECTION, SOLUTION EPIDURAL at 04:38

## 2018-04-06 RX ADMIN — SUCRALFATE SCH GM: 1 SUSPENSION ORAL at 08:31

## 2018-04-06 RX ADMIN — PANTOPRAZOLE SCH MG: 40 TABLET, DELAYED RELEASE ORAL at 08:31

## 2018-04-06 RX ADMIN — OXYCODONE HYDROCHLORIDE AND ACETAMINOPHEN PRN TAB: 5; 325 TABLET ORAL at 08:31

## 2018-04-06 RX ADMIN — CLONIDINE HYDROCHLORIDE SCH MG: 0.1 INJECTION, SOLUTION EPIDURAL at 11:05

## 2018-04-06 RX ADMIN — OXYCODONE HYDROCHLORIDE AND ACETAMINOPHEN PRN TAB: 5; 325 TABLET ORAL at 16:41

## 2018-04-06 RX ADMIN — Medication PRN ML: at 11:06

## 2018-04-06 NOTE — HHI.PR
__________________________________________________





Subjective


Subjective Notes


Some pain but continues to improve. Stool in bag.  Still weak.





Objective


Vitals/I&O





Vital Signs








  Date Time  Temp Pulse Resp B/P (MAP) Pulse Ox O2 Delivery O2 Flow Rate FiO2


 


4/6/18 07:42   20     


 


4/6/18 00:00 98.2 55  159/89 (112) 96   


 


4/2/18 17:30      Nasal Cannula 3 








Radiology





Last Impressions








Abdomen/Pelvis CT 3/30/18 1308 Signed





Impressions: 





 Service Date/Time:  Friday, March 30, 2018 15:49 - CONCLUSION:  1. Sigmoid 





 diverticulitis 2. Moderate sized perisigmoid diverticular abscess with 





 significant enlargement compared to the prior study. 3. Second extra 

intestinal 





 air filled collection along the mid descending colon representing either air 





 tracking from the lower collection or new perforation. 4. No significant ileus

, 





 free air or other fluid collections. 5. Otherwise stable evaluation.     Thomas Radford MD 


 


Abdomen X-Ray 3/30/18 1308 Signed





Impressions: 





 Service Date/Time:  Friday, March 30, 2018 14:07 - CONCLUSION:  1. Nonspecific 





 bowel gas pattern with no evidence of free air. 2. Atelectasis in the lung 





 bases.     Anuj Garcia MD 








Narrative Exam


NAD


Abd: post op ttp, SONJA dressing in place (changed yesterday by Nataliya LOPEZ and 

incision c/d/i), colostomy with liquid stool output, ADELA ss output





A/P


Assessment and Plan


58 yo M with large peridiverticular abscess, s/p CT guided drainage 3/31, POD 4 

ex lap, left colectomy, end colostomy





Stable post op. Stool in bag, beginning to tolerate small amt of solids.





Soft diet.


Packing changes to LLQ wound daily.


Cont ADELA drain, SONJA dressing.


Cont IV antibiotics. D/c pca.


Needs to be OOB in chair and ambulating. 


DVT proph: lovenox





Likely d/c home tomorrow with SONJA dressing, ADELA drain, packing to LLQ wound.











Elroy Leslie MD Apr 6, 2018 09:02

## 2018-04-06 NOTE — [email or web address].NOT
Wound Consult


Description:


Consult for NEW OSTOMY TEACHING LUQ per Dr Leslie


Communicated with:


Patient


Recommendation:


Honey Ostomy RN for concerns/questions regarding ostomy.


Reference educational booklet left at bedside for reinforcement of teaching 

today.


Ensure the pouch is closed at the bottom if emptied.


Ensure the pouch is closed at the flange if air is released.


Continue to monitor stoma for color and output.


Additional Information:


Patient seen on [facility] [address] for ostomy assessment and further teaching with 

reinforcement.





Ostomy


Type:  Colostomy


Surgeon:  Elroy Leslie MD


Date of Surgery:  Apr 2, 2018


Complete:  Starter kit (Sent out to arrive to patient home Monday 04/09/18), 

Education materials (available at bedside ), Rx (Prescriptions left on chart 

for moldable wafer and transparent open ended pouch in size 2 3/4" and 2 1/4"), 

Other (Ostomy appliance change using 2 3/4" round moldable wafer and 

transparent open ended pouch)


Educated patient on:


Stoma appearance


Stoma size


Appliance size in place vs appliance size patient will need soon due to 

shrinking stoma


Stoma function


How to remove wafer/pouch


How to cleanse peristomal skin


How to use skin prep prior to barrier application


How to measure stoma


How to apply new pouching system


When to seek medical attention (color of stoma change or no output in 2 days)


Additional information


Patient seen on [address] for ostomy assessment and reinforcement of teaching. 

Ostomy appliance is intact and noted on left side mid abdomen with SONJA 

dressing in place over midline incision that appears to be working properly, 

however seems to be positional. There is ~50ml of semi-soft brown stool noted 

in pouch that was not emptied by writer. Stoma is red, moist, moderately 

protruding, lumen noted in center of stoma and functioning with semi-soft brown 

effluent. Supplies in size 2 1/4" ordered from [facility] for patient to go home with 

at discharge along with adhesive remover and skin barrier film spray. Starter 

Kit was sent out yesterday to arrive to patient home Monday with supplies in 

size 2 3/4".











[facility] Apr 6, 2018 13:28

## 2018-04-07 VITALS
OXYGEN SATURATION: 98 % | HEART RATE: 48 BPM | RESPIRATION RATE: 20 BRPM | TEMPERATURE: 98.2 F | SYSTOLIC BLOOD PRESSURE: 152 MMHG | DIASTOLIC BLOOD PRESSURE: 81 MMHG

## 2018-04-07 VITALS
SYSTOLIC BLOOD PRESSURE: 144 MMHG | TEMPERATURE: 98 F | RESPIRATION RATE: 16 BRPM | HEART RATE: 75 BPM | DIASTOLIC BLOOD PRESSURE: 85 MMHG | OXYGEN SATURATION: 96 %

## 2018-04-07 VITALS
RESPIRATION RATE: 51 BRPM | OXYGEN SATURATION: 15 % | DIASTOLIC BLOOD PRESSURE: 94 MMHG | SYSTOLIC BLOOD PRESSURE: 166 MMHG | HEART RATE: 51 BPM | TEMPERATURE: 97.8 F

## 2018-04-07 RX ADMIN — CLONIDINE HYDROCHLORIDE SCH MG: 0.1 INJECTION, SOLUTION EPIDURAL at 05:48

## 2018-04-07 RX ADMIN — PANTOPRAZOLE SCH MG: 40 TABLET, DELAYED RELEASE ORAL at 08:45

## 2018-04-07 RX ADMIN — TAZOBACTAM SODIUM AND PIPERACILLIN SODIUM SCH MLS/HR: 375; 3 INJECTION, SOLUTION INTRAVENOUS at 05:48

## 2018-04-07 RX ADMIN — CLONIDINE HYDROCHLORIDE SCH MG: 0.1 INJECTION, SOLUTION EPIDURAL at 00:36

## 2018-04-07 RX ADMIN — CLONIDINE HYDROCHLORIDE SCH MG: 0.1 INJECTION, SOLUTION EPIDURAL at 11:32

## 2018-04-07 RX ADMIN — Medication SCH ML: at 08:47

## 2018-04-07 RX ADMIN — LEVOTHYROXINE SODIUM SCH MCG: 75 TABLET ORAL at 05:48

## 2018-04-07 RX ADMIN — TAZOBACTAM SODIUM AND PIPERACILLIN SODIUM SCH MLS/HR: 375; 3 INJECTION, SOLUTION INTRAVENOUS at 00:36

## 2018-04-07 RX ADMIN — OXYCODONE HYDROCHLORIDE AND ACETAMINOPHEN PRN TAB: 5; 325 TABLET ORAL at 02:46

## 2018-04-07 RX ADMIN — LEVOTHYROXINE SODIUM SCH MCG: 100 TABLET ORAL at 05:48

## 2018-04-07 RX ADMIN — OXYCODONE HYDROCHLORIDE AND ACETAMINOPHEN PRN TAB: 5; 325 TABLET ORAL at 08:45

## 2018-04-07 RX ADMIN — TAZOBACTAM SODIUM AND PIPERACILLIN SODIUM SCH MLS/HR: 375; 3 INJECTION, SOLUTION INTRAVENOUS at 11:31

## 2018-04-07 NOTE — HHI.FF
Face to Face Verification


Diagnosis:  


(1) Diverticulitis of large intestine with abscess without bleeding


(2) Colostomy in place


Physical Therapy


Order:  Evaluate and Treat, Improve ambulation, Strength and gait training





Home Health Nursing








Order: Signs/symptoms of disease process





 Wound care and dressing changes





 Nursing assessment with vital signs








Instructions:


Colostomy care and teaching





SONJA dressing to remain in place





ADELA drain care





LLQ incision packing change with iodoform daily











I have seen patient Pacheco Kenny on 4/7/18. My clinical findings support 

the need for the requested home health care services because:








 Ltd mobility - disease progression





 Deconditioned w/ increased weakness





 Limited ability to care for self














I certify that my clinical findings support that this patient is homebound 

because:








 Post-op weakness

















Anuj,Elroy ROMAN Apr 7, 2018 09:45